# Patient Record
Sex: FEMALE | Race: ASIAN | NOT HISPANIC OR LATINO | URBAN - METROPOLITAN AREA
[De-identification: names, ages, dates, MRNs, and addresses within clinical notes are randomized per-mention and may not be internally consistent; named-entity substitution may affect disease eponyms.]

---

## 2017-01-06 ENCOUNTER — EMERGENCY (EMERGENCY)
Facility: HOSPITAL | Age: 37
LOS: 1 days | Discharge: HOME | End: 2017-01-06
Attending: EMERGENCY MEDICINE | Admitting: EMERGENCY MEDICINE
Payer: COMMERCIAL

## 2017-01-06 VITALS
HEART RATE: 97 BPM | SYSTOLIC BLOOD PRESSURE: 118 MMHG | TEMPERATURE: 98 F | RESPIRATION RATE: 18 BRPM | OXYGEN SATURATION: 100 % | DIASTOLIC BLOOD PRESSURE: 77 MMHG

## 2017-01-06 VITALS
SYSTOLIC BLOOD PRESSURE: 110 MMHG | DIASTOLIC BLOOD PRESSURE: 83 MMHG | OXYGEN SATURATION: 100 % | HEART RATE: 69 BPM | RESPIRATION RATE: 18 BRPM

## 2017-01-06 PROCEDURE — 99284 EMERGENCY DEPT VISIT MOD MDM: CPT | Mod: 25

## 2017-01-06 PROCEDURE — 93010 ELECTROCARDIOGRAM REPORT: CPT

## 2017-01-06 NOTE — ED PROVIDER NOTE - PLAN OF CARE
1) Please follow-up with your primary care doctor in the next 5-7 days.  Please call tomorrow for an appointment.  If you cannot follow-up with your primary care doctor please return to the ED for any urgent issues.  2) You were given a copy of the tests performed today.  Please bring the results with you and review them with your primary care doctor.  3) If you have any worsening of symptoms or any other concerns please return to the ED immediately.  4) Please continue taking your home medications as directed.  5) Please follow-up with Endocrinology as an outpatient for further management of your hypothyroidism.  You were given a referral list.

## 2017-01-06 NOTE — ED PROVIDER NOTE - MEDICAL DECISION MAKING DETAILS
37 yo female w/ pmh of hypothyroidism presents w/ palpitations after taking over the counter thyroid supplementation.  likely side effect of that medication.  instructed patient to stop taking that medication and follow-up with endocrinology as an outpatient.

## 2017-01-06 NOTE — ED ADULT TRIAGE NOTE - CHIEF COMPLAINT QUOTE
pt says she woke up with palpitation .pt says she took hypothyroidism medicine and went to sleep and woke up with palpitation.," my body felt hot". denies CP or SOB. c/o mild chest discomfort.also had some pain in the chest when she woke up which radiated down her left arm which lasted for 10 minutes.

## 2017-01-06 NOTE — ED PROVIDER NOTE - ATTENDING CONTRIBUTION TO CARE
Dr. Ramírez: I have personally performed a face to face bedside history and physical examination of this patient. I have discussed the history, examination, review of systems, assessment and plan of management with the resident. I have reviewed the electronic medical record and amended it to reflect my history, review of systems, physical exam, assessment and plan.  37 yo F dx'ed with hypothyroidism 2 mos ago, not started on any meds, took a thyroid supplement (has iodine in it)  yesterday from [employer]. After taking the supplement pt felt her heart racing and body burning, which has now resolved. No cp or sob. Takes probiotics and MVI, no other supplements.  On exam pt appears well, nad, rrr, ctab, abdo soft/nt/nd.  Plan - rx likely 2ndry to supplements, now resolved. Advised pt not to take non-rx supplements. Endocrine f/u given.

## 2017-01-06 NOTE — ED PROVIDER NOTE - CARE PLAN
Principal Discharge DX:	Palpitations  Instructions for follow-up, activity and diet:	1) Please follow-up with your primary care doctor in the next 5-7 days.  Please call tomorrow for an appointment.  If you cannot follow-up with your primary care doctor please return to the ED for any urgent issues.  2) You were given a copy of the tests performed today.  Please bring the results with you and review them with your primary care doctor.  3) If you have any worsening of symptoms or any other concerns please return to the ED immediately.  4) Please continue taking your home medications as directed.  5) Please follow-up with Endocrinology as an outpatient for further management of your hypothyroidism.  You were given a referral list.

## 2017-01-06 NOTE — ED PROVIDER NOTE - OBJECTIVE STATEMENT
35 yo female w/ pmh of hypothyroidism presents w/ palpiations.  Pt states that she was diagnosed w/ hypothyroidism as an outpatient, not started on any medications.  Pt ordered some thyroid supplements from [facility] and started taking them today.  "Thyroid Support" (with Iodine).  States that she woke up this morning w/ palpitations and fevers.    Denies fever, chills, SOB, weakness, N, V, D, abdominal pain, bladder and bowel problems, leg swelling, sick ocntact, recent travel.  LMP:  12/26/2016  Medications:  Vitamins,Probiotics 37 yo female w/ pmh of hypothyroidism presents w/ palpitations.  Pt states that she was diagnosed w/ hypothyroidism as an outpatient, not started on any medications.  Pt ordered some thyroid supplements from [facility] [place] and started taking them today.  "Thyroid Support" (with Iodine).  States that she woke up this morning w/ palpitations and fevers.    Denies fever, chills, SOB, weakness, N, V, D, abdominal pain, bladder and bowel problems, leg swelling, sick ocntact, recent travel.  LMP:  12/26/2016  Medications:  Vitamins,Probiotics 35 yo female w/ pmh of hypothyroidism presents w/ palpitations.  Pt states that she was diagnosed w/ hypothyroidism as an outpatient, not started on any medications.  Pt ordered some thyroid supplements from [facility] and started taking them today.  "Thyroid Support" (with Iodine).  States that she woke up this morning w/ palpitations and fevers.    Denies fever, chills, SOB, weakness, N, V, D, abdominal pain, bladder and bowel problems, leg swelling, sick contact, recent travel.  LMP:  12/26/2016  Medications:  Vitamins,Probiotics

## 2017-03-02 ENCOUNTER — APPOINTMENT (OUTPATIENT)
Dept: ENDOCRINOLOGY | Facility: CLINIC | Age: 37
End: 2017-03-02

## 2017-04-07 ENCOUNTER — APPOINTMENT (OUTPATIENT)
Dept: HUMAN REPRODUCTION | Facility: CLINIC | Age: 37
End: 2017-04-07

## 2017-04-18 ENCOUNTER — APPOINTMENT (OUTPATIENT)
Dept: HUMAN REPRODUCTION | Facility: CLINIC | Age: 37
End: 2017-04-18

## 2017-04-25 ENCOUNTER — APPOINTMENT (OUTPATIENT)
Dept: HUMAN REPRODUCTION | Facility: CLINIC | Age: 37
End: 2017-04-25

## 2017-05-11 ENCOUNTER — APPOINTMENT (OUTPATIENT)
Dept: HUMAN REPRODUCTION | Facility: CLINIC | Age: 37
End: 2017-05-11

## 2017-05-11 RX ORDER — GANIRELIX ACETATE 250 UG/.5ML
250 INJECTION, SOLUTION SUBCUTANEOUS
Qty: 7 | Refills: 1 | Status: ACTIVE | COMMUNITY
Start: 2017-05-10 | End: 1900-01-01

## 2017-05-11 RX ORDER — MENOTROPINS 75 UNIT
75 KIT SUBCUTANEOUS
Qty: 30 | Refills: 1 | Status: ACTIVE | COMMUNITY
Start: 2017-05-10 | End: 1900-01-01

## 2017-05-11 RX ORDER — SYRINGE WITH NEEDLE, 1 ML 25GX5/8"
22G X 1-1/2" SYRINGE, EMPTY DISPOSABLE MISCELLANEOUS
Qty: 20 | Refills: 1 | Status: ACTIVE | COMMUNITY
Start: 2017-05-10 | End: 1900-01-01

## 2017-05-11 RX ORDER — DESOGESTREL AND ETHINYL ESTRADIOL 0.15-0.03
0.15-3 KIT ORAL DAILY
Qty: 1 | Refills: 1 | Status: ACTIVE | COMMUNITY
Start: 2017-04-18 | End: 1900-01-01

## 2017-05-11 RX ORDER — ISOPROPYL ALCOHOL 70 ML/100ML
SWAB TOPICAL
Qty: 50 | Refills: 0 | Status: ACTIVE | COMMUNITY
Start: 2017-05-10 | End: 1900-01-01

## 2017-05-11 RX ORDER — NEEDLES, DISPOSABLE 25GX5/8"
27G X 1/2" NEEDLE, DISPOSABLE MISCELLANEOUS
Qty: 20 | Refills: 0 | Status: ACTIVE | COMMUNITY
Start: 2017-05-10 | End: 1900-01-01

## 2017-05-11 RX ORDER — FOLLITROPIN ALFA 1050 UNIT
1050 KIT SUBCUTANEOUS
Qty: 3 | Refills: 1 | Status: ACTIVE | COMMUNITY
Start: 2017-05-10 | End: 1900-01-01

## 2017-05-11 RX ORDER — DOXYCYCLINE HYCLATE 100 MG/1
100 CAPSULE ORAL TWICE DAILY
Qty: 10 | Refills: 0 | Status: ACTIVE | COMMUNITY
Start: 2017-05-10 | End: 1900-01-01

## 2017-05-11 RX ORDER — CONTAINER,EMPTY
EACH MISCELLANEOUS
Qty: 2 | Refills: 0 | Status: ACTIVE | COMMUNITY
Start: 2017-05-10 | End: 1900-01-01

## 2017-05-15 ENCOUNTER — OTHER (OUTPATIENT)
Age: 37
End: 2017-05-15

## 2017-05-15 ENCOUNTER — APPOINTMENT (OUTPATIENT)
Dept: HUMAN REPRODUCTION | Facility: CLINIC | Age: 37
End: 2017-05-15

## 2017-05-15 RX ORDER — CONTAINER,EMPTY
EACH MISCELLANEOUS
Qty: 2 | Refills: 0 | Status: ACTIVE | COMMUNITY
Start: 2017-05-15 | End: 1900-01-01

## 2017-05-15 RX ORDER — ISOPROPYL ALCOHOL 70 ML/100ML
SWAB TOPICAL
Qty: 50 | Refills: 0 | Status: ACTIVE | COMMUNITY
Start: 2017-05-15 | End: 1900-01-01

## 2017-05-15 RX ORDER — METHYLPREDNISOLONE 8 MG/1
8 TABLET ORAL
Qty: 10 | Refills: 0 | Status: ACTIVE | COMMUNITY
Start: 2017-05-15 | End: 1900-01-01

## 2017-05-15 RX ORDER — DOXYCYCLINE HYCLATE 100 MG/1
100 CAPSULE ORAL TWICE DAILY
Qty: 10 | Refills: 0 | Status: ACTIVE | COMMUNITY
Start: 2017-05-15 | End: 1900-01-01

## 2017-05-15 RX ORDER — PROGESTERONE 90 MG/1.125G
8 GEL VAGINAL
Qty: 30 | Refills: 2 | Status: ACTIVE | COMMUNITY
Start: 2017-05-15 | End: 1900-01-01

## 2017-05-15 RX ORDER — SYRINGE WITH NEEDLE, 1 ML 25GX5/8"
22G X 1-1/2" SYRINGE, EMPTY DISPOSABLE MISCELLANEOUS
Qty: 20 | Refills: 1 | Status: ACTIVE | COMMUNITY
Start: 2017-05-15 | End: 1900-01-01

## 2017-05-15 RX ORDER — MENOTROPINS 75 UNIT
75 KIT SUBCUTANEOUS
Qty: 30 | Refills: 1 | Status: ACTIVE | COMMUNITY
Start: 2017-05-15 | End: 1900-01-01

## 2017-05-15 RX ORDER — FOLLITROPIN 900 [IU]/1.08ML
900 INJECTION, SOLUTION SUBCUTANEOUS
Qty: 3 | Refills: 1 | Status: ACTIVE | COMMUNITY
Start: 2017-05-15 | End: 1900-01-01

## 2017-05-15 RX ORDER — NEEDLES, DISPOSABLE 25GX5/8"
27G X 1/2" NEEDLE, DISPOSABLE MISCELLANEOUS
Qty: 20 | Refills: 0 | Status: ACTIVE | COMMUNITY
Start: 2017-05-15 | End: 1900-01-01

## 2017-05-15 RX ORDER — CHORIONIC GONADOTROPIN 10000 UNIT
10000 KIT INTRAMUSCULAR
Qty: 1 | Refills: 0 | Status: ACTIVE | OUTPATIENT
Start: 2017-05-15

## 2017-05-22 ENCOUNTER — OTHER (OUTPATIENT)
Age: 37
End: 2017-05-22

## 2017-05-22 RX ORDER — CETRORELIX ACETATE 0.25 MG
0.25 KIT SUBCUTANEOUS
Qty: 8 | Refills: 1 | Status: ACTIVE | COMMUNITY
Start: 2017-05-22 | End: 1900-01-01

## 2017-06-05 ENCOUNTER — APPOINTMENT (OUTPATIENT)
Dept: HUMAN REPRODUCTION | Facility: CLINIC | Age: 37
End: 2017-06-05

## 2017-06-11 ENCOUNTER — APPOINTMENT (OUTPATIENT)
Dept: HUMAN REPRODUCTION | Facility: CLINIC | Age: 37
End: 2017-06-11

## 2017-06-12 ENCOUNTER — APPOINTMENT (OUTPATIENT)
Dept: HUMAN REPRODUCTION | Facility: CLINIC | Age: 37
End: 2017-06-12

## 2017-06-14 ENCOUNTER — APPOINTMENT (OUTPATIENT)
Dept: HUMAN REPRODUCTION | Facility: CLINIC | Age: 37
End: 2017-06-14

## 2017-06-15 ENCOUNTER — APPOINTMENT (OUTPATIENT)
Dept: HUMAN REPRODUCTION | Facility: CLINIC | Age: 37
End: 2017-06-15

## 2017-06-16 ENCOUNTER — APPOINTMENT (OUTPATIENT)
Dept: HUMAN REPRODUCTION | Facility: CLINIC | Age: 37
End: 2017-06-16

## 2017-06-17 ENCOUNTER — APPOINTMENT (OUTPATIENT)
Dept: HUMAN REPRODUCTION | Facility: CLINIC | Age: 37
End: 2017-06-17

## 2017-06-18 ENCOUNTER — APPOINTMENT (OUTPATIENT)
Dept: HUMAN REPRODUCTION | Facility: CLINIC | Age: 37
End: 2017-06-18

## 2017-06-19 ENCOUNTER — APPOINTMENT (OUTPATIENT)
Dept: HUMAN REPRODUCTION | Facility: CLINIC | Age: 37
End: 2017-06-19

## 2017-06-20 ENCOUNTER — APPOINTMENT (OUTPATIENT)
Dept: HUMAN REPRODUCTION | Facility: CLINIC | Age: 37
End: 2017-06-20

## 2017-06-23 ENCOUNTER — APPOINTMENT (OUTPATIENT)
Dept: HUMAN REPRODUCTION | Facility: CLINIC | Age: 37
End: 2017-06-23

## 2017-06-25 ENCOUNTER — APPOINTMENT (OUTPATIENT)
Dept: HUMAN REPRODUCTION | Facility: CLINIC | Age: 37
End: 2017-06-25

## 2017-07-05 ENCOUNTER — OUTPATIENT (OUTPATIENT)
Dept: OUTPATIENT SERVICES | Facility: HOSPITAL | Age: 37
LOS: 1 days | Discharge: STILL ADMITTED OR ADMINISTRATIVE | End: 2017-07-05

## 2017-07-06 DIAGNOSIS — F41.9 ANXIETY DISORDER, UNSPECIFIED: ICD-10-CM

## 2017-07-10 ENCOUNTER — APPOINTMENT (OUTPATIENT)
Dept: HUMAN REPRODUCTION | Facility: CLINIC | Age: 37
End: 2017-07-10

## 2017-09-08 ENCOUNTER — APPOINTMENT (OUTPATIENT)
Dept: HUMAN REPRODUCTION | Facility: CLINIC | Age: 37
End: 2017-09-08
Payer: COMMERCIAL

## 2017-09-08 DIAGNOSIS — N97.9 FEMALE INFERTILITY, UNSPECIFIED: ICD-10-CM

## 2017-09-08 PROCEDURE — 99213 OFFICE O/P EST LOW 20 MIN: CPT | Mod: 25

## 2017-09-08 PROCEDURE — 76830 TRANSVAGINAL US NON-OB: CPT

## 2017-09-08 PROCEDURE — 36415 COLL VENOUS BLD VENIPUNCTURE: CPT

## 2017-09-08 RX ORDER — PROGESTERONE 90 MG/1.125G
8 GEL VAGINAL
Qty: 30 | Refills: 4 | Status: ACTIVE | COMMUNITY
Start: 2017-09-08 | End: 1900-01-01

## 2017-09-08 RX ORDER — METHYLPREDNISOLONE 8 MG/1
8 TABLET ORAL
Qty: 8 | Refills: 1 | Status: ACTIVE | COMMUNITY
Start: 2017-09-08 | End: 1900-01-01

## 2017-09-08 RX ORDER — DOXYCYCLINE HYCLATE 100 MG/1
100 CAPSULE ORAL
Qty: 8 | Refills: 1 | Status: ACTIVE | COMMUNITY
Start: 2017-09-08 | End: 1900-01-01

## 2017-09-09 ENCOUNTER — EMERGENCY (EMERGENCY)
Facility: HOSPITAL | Age: 37
LOS: 1 days | Discharge: HOME | End: 2017-09-09
Attending: EMERGENCY MEDICINE | Admitting: EMERGENCY MEDICINE
Payer: COMMERCIAL

## 2017-09-09 VITALS
SYSTOLIC BLOOD PRESSURE: 134 MMHG | HEART RATE: 70 BPM | TEMPERATURE: 98 F | RESPIRATION RATE: 15 BRPM | OXYGEN SATURATION: 99 % | DIASTOLIC BLOOD PRESSURE: 91 MMHG

## 2017-09-09 PROCEDURE — 99284 EMERGENCY DEPT VISIT MOD MDM: CPT

## 2017-09-09 RX ORDER — IBUPROFEN 200 MG
600 TABLET ORAL ONCE
Qty: 0 | Refills: 0 | Status: COMPLETED | OUTPATIENT
Start: 2017-09-09 | End: 2017-09-09

## 2017-09-09 NOTE — ED ADULT TRIAGE NOTE - CHIEF COMPLAINT QUOTE
Pt c/o "pulsating sensation in head for past 2 hours."  Pt reporting dizziness, states "I'm going to faint."  Pt taken to ambulance bay to lay down. Pt c/o "pulsating sensation in head for past 2 hours."  Pt reporting dizziness, states "I'm going to faint."  Pt taken to ambulance bay to lay down.  Pt receives every Milrinone in D5W every 24 hrs through PICC line from visiting RN service.

## 2017-09-10 VITALS
HEART RATE: 70 BPM | TEMPERATURE: 98 F | SYSTOLIC BLOOD PRESSURE: 137 MMHG | DIASTOLIC BLOOD PRESSURE: 94 MMHG | OXYGEN SATURATION: 100 % | RESPIRATION RATE: 18 BRPM

## 2017-09-10 PROCEDURE — 70450 CT HEAD/BRAIN W/O DYE: CPT | Mod: 26

## 2017-09-10 RX ADMIN — Medication 600 MILLIGRAM(S): at 01:18

## 2017-09-10 RX ADMIN — Medication 600 MILLIGRAM(S): at 00:18

## 2017-09-10 NOTE — ED PROVIDER NOTE - OBJECTIVE STATEMENT
37 yo F with hx of chronic HAs, states rarely takes OTC meds, gets ~1 HA per month which is strong and lasts "all day" and often vomits and goes to sleep, then feels better. Now with gradual onset of HA similar to prev. which worsened today, is one-sided and pounding, took OTC meds with no relief at home, then vomited at home and does not feel better. +mild photophobia, no change in vision. No prev. W/U, denies hx of migraine or family hx of migraine, no neck stiffness.

## 2017-09-10 NOTE — ED PROVIDER NOTE - PROGRESS NOTE DETAILS
LEIGHTON Cazares: Pt signed out to me by Dr. Faust to follow up CT head. CT head negative. Pt to go home with follow up with Neurologist. LEIGHTON Cazares: Pt signed out to me by Dr. Faust to follow up CT head if CT head negative ( result negative) pt ok to go home with neuro followup. Pt to go home with follow up with Neurologist. LEIGHTON Cazares: Pt signed out to me by Dr. Faust to follow up CT head if CT head negative ( result negative) pt feeling better and amendable with plan to go home with neuro followup. Pt to go home with follow up with Neurologist.

## 2017-09-10 NOTE — ED PROVIDER NOTE - MEDICAL DECISION MAKING DETAILS
37yo F with HA, +hx of chronic HAs but now worse, similar to prev. today but more intractable, no prev. imaging. CT head, pain ctrl, reassess, poss. Neuro FU.

## 2017-09-10 NOTE — ED PROVIDER NOTE - CARE PLAN
Principal Discharge DX:	Headache Principal Discharge DX:	Headache  Instructions for follow-up, activity and diet:	Follow up with Neurologist (referral list provided) within the week. Follow up with your Primary Medical Doctor within 2-3days. If results or reports were given to you, show copies of your reports given to you. Take all of your medications as previously prescribed. Take Motrin 600mg every 8hrs with food for pain. Take Tylenol 650mg 1 tab every 4-6 hours as needed for pain. Principal Discharge DX:	Headache  Instructions for follow-up, activity and diet:	Follow up with Neurologist (referral list provided) within the week. Follow up with your Primary Medical Doctor within 2-3days. If results or reports were given to you, show copies of your reports given to you. Take all of your medications as previously prescribed. Take Motrin 600mg every 8hrs with food for pain. Take Tylenol 650mg 1 tab every 4-6 hours as needed for pain. If worsening headache, fevers, vomiting, visual changes or any other new or concerning symptoms return to the ED or seel medical attention.

## 2017-09-10 NOTE — ED ADULT NURSE NOTE - CHIEF COMPLAINT QUOTE
Pt c/o "pulsating sensation in head for past 2 hours."  Pt reporting dizziness, states "I'm going to faint."  Pt taken to ambulance bay to lay down.

## 2017-09-10 NOTE — ED ADULT NURSE NOTE - OBJECTIVE STATEMENT
pt. came in c/o pressure on her head x 2days and dizziness when she walks. denies any head trauma, no n/v/fever. denies any numbness/weakness/neuro def. pt. seen by MD. Motrin given as ordered. awaits CT.

## 2017-09-10 NOTE — ED PROVIDER NOTE - PLAN OF CARE
Follow up with Neurologist (referral list provided) within the week. Follow up with your Primary Medical Doctor within 2-3days. If results or reports were given to you, show copies of your reports given to you. Take all of your medications as previously prescribed. Take Motrin 600mg every 8hrs with food for pain. Take Tylenol 650mg 1 tab every 4-6 hours as needed for pain. Follow up with Neurologist (referral list provided) within the week. Follow up with your Primary Medical Doctor within 2-3days. If results or reports were given to you, show copies of your reports given to you. Take all of your medications as previously prescribed. Take Motrin 600mg every 8hrs with food for pain. Take Tylenol 650mg 1 tab every 4-6 hours as needed for pain. If worsening headache, fevers, vomiting, visual changes or any other new or concerning symptoms return to the ED or seel medical attention.

## 2017-09-11 ENCOUNTER — APPOINTMENT (OUTPATIENT)
Dept: HUMAN REPRODUCTION | Facility: CLINIC | Age: 37
End: 2017-09-11

## 2017-09-13 ENCOUNTER — EMERGENCY (EMERGENCY)
Facility: HOSPITAL | Age: 37
LOS: 1 days | Discharge: HOME | End: 2017-09-13
Attending: EMERGENCY MEDICINE | Admitting: EMERGENCY MEDICINE
Payer: COMMERCIAL

## 2017-09-13 VITALS
DIASTOLIC BLOOD PRESSURE: 63 MMHG | RESPIRATION RATE: 18 BRPM | OXYGEN SATURATION: 100 % | HEIGHT: 64 IN | WEIGHT: 190.04 LBS | TEMPERATURE: 98 F | SYSTOLIC BLOOD PRESSURE: 140 MMHG | HEART RATE: 90 BPM

## 2017-09-13 PROCEDURE — 99284 EMERGENCY DEPT VISIT MOD MDM: CPT | Mod: 25

## 2017-09-13 NOTE — ED ADULT TRIAGE NOTE - CHIEF COMPLAINT QUOTE
pt c/o top of head and forehead numbness x 2 days and reports she has been unable to sleep since which has been making her feel anxious. denies ha, dizziness, visual disturbances or any additional symptoms. PMHX: postpartum depression

## 2017-09-14 ENCOUNTER — APPOINTMENT (OUTPATIENT)
Dept: HUMAN REPRODUCTION | Facility: CLINIC | Age: 37
End: 2017-09-14

## 2017-09-14 VITALS
HEART RATE: 99 BPM | RESPIRATION RATE: 16 BRPM | OXYGEN SATURATION: 100 % | TEMPERATURE: 98 F | SYSTOLIC BLOOD PRESSURE: 133 MMHG | DIASTOLIC BLOOD PRESSURE: 88 MMHG

## 2017-09-14 LAB
ALBUMIN SERPL ELPH-MCNC: 4.5 G/DL — SIGNIFICANT CHANGE UP (ref 3.3–5)
ALP SERPL-CCNC: 65 U/L — SIGNIFICANT CHANGE UP (ref 40–120)
ALT FLD-CCNC: 19 U/L — SIGNIFICANT CHANGE UP (ref 4–33)
AST SERPL-CCNC: 18 U/L — SIGNIFICANT CHANGE UP (ref 4–32)
BASOPHILS # BLD AUTO: 0.07 K/UL — SIGNIFICANT CHANGE UP (ref 0–0.2)
BASOPHILS NFR BLD AUTO: 0.9 % — SIGNIFICANT CHANGE UP (ref 0–2)
BILIRUB SERPL-MCNC: 0.3 MG/DL — SIGNIFICANT CHANGE UP (ref 0.2–1.2)
BUN SERPL-MCNC: 12 MG/DL — SIGNIFICANT CHANGE UP (ref 7–23)
CALCIUM SERPL-MCNC: 9.5 MG/DL — SIGNIFICANT CHANGE UP (ref 8.4–10.5)
CHLORIDE SERPL-SCNC: 103 MMOL/L — SIGNIFICANT CHANGE UP (ref 98–107)
CO2 SERPL-SCNC: 24 MMOL/L — SIGNIFICANT CHANGE UP (ref 22–31)
CREAT SERPL-MCNC: 0.66 MG/DL — SIGNIFICANT CHANGE UP (ref 0.5–1.3)
EOSINOPHIL # BLD AUTO: 0.26 K/UL — SIGNIFICANT CHANGE UP (ref 0–0.5)
EOSINOPHIL NFR BLD AUTO: 3.5 % — SIGNIFICANT CHANGE UP (ref 0–6)
GLUCOSE SERPL-MCNC: 105 MG/DL — HIGH (ref 70–99)
HCT VFR BLD CALC: 42.5 % — SIGNIFICANT CHANGE UP (ref 34.5–45)
HGB BLD-MCNC: 14.8 G/DL — SIGNIFICANT CHANGE UP (ref 11.5–15.5)
IMM GRANULOCYTES # BLD AUTO: 0.02 # — SIGNIFICANT CHANGE UP
IMM GRANULOCYTES NFR BLD AUTO: 0.3 % — SIGNIFICANT CHANGE UP (ref 0–1.5)
LYMPHOCYTES # BLD AUTO: 3.76 K/UL — HIGH (ref 1–3.3)
LYMPHOCYTES # BLD AUTO: 49.9 % — HIGH (ref 13–44)
MCHC RBC-ENTMCNC: 29.4 PG — SIGNIFICANT CHANGE UP (ref 27–34)
MCHC RBC-ENTMCNC: 34.8 % — SIGNIFICANT CHANGE UP (ref 32–36)
MCV RBC AUTO: 84.5 FL — SIGNIFICANT CHANGE UP (ref 80–100)
MONOCYTES # BLD AUTO: 0.64 K/UL — SIGNIFICANT CHANGE UP (ref 0–0.9)
MONOCYTES NFR BLD AUTO: 8.5 % — SIGNIFICANT CHANGE UP (ref 2–14)
NEUTROPHILS # BLD AUTO: 2.78 K/UL — SIGNIFICANT CHANGE UP (ref 1.8–7.4)
NEUTROPHILS NFR BLD AUTO: 36.9 % — LOW (ref 43–77)
NRBC # FLD: 0 — SIGNIFICANT CHANGE UP
PLATELET # BLD AUTO: 238 K/UL — SIGNIFICANT CHANGE UP (ref 150–400)
PMV BLD: 9.8 FL — SIGNIFICANT CHANGE UP (ref 7–13)
POTASSIUM SERPL-MCNC: 3.9 MMOL/L — SIGNIFICANT CHANGE UP (ref 3.5–5.3)
POTASSIUM SERPL-SCNC: 3.9 MMOL/L — SIGNIFICANT CHANGE UP (ref 3.5–5.3)
PROT SERPL-MCNC: 7.5 G/DL — SIGNIFICANT CHANGE UP (ref 6–8.3)
RBC # BLD: 5.03 M/UL — SIGNIFICANT CHANGE UP (ref 3.8–5.2)
RBC # FLD: 12.5 % — SIGNIFICANT CHANGE UP (ref 10.3–14.5)
SODIUM SERPL-SCNC: 140 MMOL/L — SIGNIFICANT CHANGE UP (ref 135–145)
TSH SERPL-MCNC: 3.69 UIU/ML — SIGNIFICANT CHANGE UP (ref 0.27–4.2)
WBC # BLD: 7.53 K/UL — SIGNIFICANT CHANGE UP (ref 3.8–10.5)
WBC # FLD AUTO: 7.53 K/UL — SIGNIFICANT CHANGE UP (ref 3.8–10.5)

## 2017-09-14 RX ORDER — SODIUM CHLORIDE 9 MG/ML
1000 INJECTION INTRAMUSCULAR; INTRAVENOUS; SUBCUTANEOUS ONCE
Qty: 0 | Refills: 0 | Status: DISCONTINUED | OUTPATIENT
Start: 2017-09-14 | End: 2017-09-14

## 2017-09-14 RX ORDER — ACETAMINOPHEN 500 MG
650 TABLET ORAL ONCE
Qty: 0 | Refills: 0 | Status: COMPLETED | OUTPATIENT
Start: 2017-09-14 | End: 2017-09-14

## 2017-09-14 RX ORDER — ZOLPIDEM TARTRATE 10 MG/1
1 TABLET ORAL
Qty: 2 | Refills: 0 | OUTPATIENT
Start: 2017-09-14 | End: 2017-09-16

## 2017-09-14 RX ORDER — METOCLOPRAMIDE HCL 10 MG
10 TABLET ORAL ONCE
Qty: 0 | Refills: 0 | Status: DISCONTINUED | OUTPATIENT
Start: 2017-09-14 | End: 2017-09-14

## 2017-09-14 RX ADMIN — Medication 650 MILLIGRAM(S): at 04:28

## 2017-09-14 NOTE — ED PROVIDER NOTE - ATTENDING CONTRIBUTION TO CARE
36f, pmhx hypothyroid, per pt resolved after she took some herbal meds. her main complaint is that she has not been able to sleep for 3 days. c/o feeling tired and weak afterwards and feels dizzy when she walks. she presented to the ED 2 days ago for h/a and had a neg CT head. she still c/o head fullness but no weakness/numbnesn/parasthesias or change in vision. no ataxia. It appears her main complaint is the not sleeping. she does not know why, says not anxious and other symptoms are secondary. exam, vs wnl, nad. hs and lungs normal, abdo benign. CN II-XII normal. motor/sensation/tone/reflexes normal. gait normal, cerebellar signs normal. labs not checked 2 days ago so will check them with tsh today. no need for repeat head imaging given non-specific h/a and normal exam. if bloods normal, will d/c. has pomd for f/u.

## 2017-09-14 NOTE — ED PROVIDER NOTE - PLAN OF CARE
Follow up with your medical doctor in 2-3 days or call our clinic at [phone] and state you were seen in the Emergency Department and would like to be seen in clinic.   Please take ambien at night as needed to sleep.  Please do not drive or operate heavy machinery on this medication.  Please do not drink on this medication.    Drink at least 2 Liters or 64 Ounces of water each day.  Return for any persistent, worsening symptoms, or ANY concerns at all.

## 2017-09-14 NOTE — ED PROVIDER NOTE - CARE PLAN
Principal Discharge DX:	Tiredness Principal Discharge DX:	Tiredness  Instructions for follow-up, activity and diet:	Follow up with your medical doctor in 2-3 days or call our clinic at [phone] and state you were seen in the Emergency Department and would like to be seen in clinic.   Please take ambien at night as needed to sleep.  Please do not drive or operate heavy machinery on this medication.  Please do not drink on this medication.    Drink at least 2 Liters or 64 Ounces of water each day.  Return for any persistent, worsening symptoms, or ANY concerns at all.

## 2017-09-14 NOTE — ED PROVIDER NOTE - PROGRESS NOTE DETAILS
refusing iv except for butterfly for meds refusing iv except for butterfly for meds -rodolfo patient was sleeping comfortably when I went back to re assess the patient. - rodolfo patient was sleeping comfortably when I went back to re assess the patient. - rodolfo Xiao: labs normal. stable for d/c with pmd f/u.

## 2017-09-14 NOTE — ED PROVIDER NOTE - OBJECTIVE STATEMENT
37 yo female presenting with two day hx of inability to sleep, head fullness and tiredness.  patient was seen 2 days ago and was discharged after negative head ct. 37 yo female presenting with two day hx of inability to sleep, head fullness and tiredness.  patient was seen 2 days ago and was discharged after negative head ct.  states she feels more tired than usual.  worse with exertion, improved with rest has not taken anything for her symptoms.  head symptoms feels like heaviness/pressure, 5/10 in severity with no radiation located predominantly in the front of her head.  denies fevers chills n/v/d.

## 2017-09-14 NOTE — ED ADULT NURSE NOTE - OBJECTIVE STATEMENT
pt. a&ox3, came in c/o "I cannot sleep for the past 3-4 days. now, I feel numbness on my head." pt. also feels nauseaous, no vomiting, feels generalized weakness. pt. seen by MD. pt. refused IV line but allowed labs to be drawn by butterfly. MD Domingo made aware. meds given as ordered. awaits further eval. pt. a&ox3, came in c/o "I cannot sleep for the past 3-4 days. now, I feel numbness on my head." pt. also feels nauseaous, no vomiting, feels generalized weakness. denies any fever. pt. seen by MD. pt. refused IV line but allowed labs to be drawn by butterfly. MD Domingo made aware. meds given as ordered. awaits further eval.

## 2017-09-15 ENCOUNTER — APPOINTMENT (OUTPATIENT)
Dept: HUMAN REPRODUCTION | Facility: CLINIC | Age: 37
End: 2017-09-15
Payer: COMMERCIAL

## 2017-09-15 PROCEDURE — 99213 OFFICE O/P EST LOW 20 MIN: CPT | Mod: 25

## 2017-09-15 PROCEDURE — 36415 COLL VENOUS BLD VENIPUNCTURE: CPT

## 2017-09-15 PROCEDURE — 76830 TRANSVAGINAL US NON-OB: CPT

## 2017-09-18 ENCOUNTER — APPOINTMENT (OUTPATIENT)
Dept: HUMAN REPRODUCTION | Facility: CLINIC | Age: 37
End: 2017-09-18
Payer: COMMERCIAL

## 2017-09-18 PROCEDURE — 76830 TRANSVAGINAL US NON-OB: CPT

## 2017-09-18 PROCEDURE — 99213 OFFICE O/P EST LOW 20 MIN: CPT | Mod: 25

## 2017-09-18 PROCEDURE — 36415 COLL VENOUS BLD VENIPUNCTURE: CPT

## 2017-09-21 ENCOUNTER — APPOINTMENT (OUTPATIENT)
Dept: HUMAN REPRODUCTION | Facility: CLINIC | Age: 37
End: 2017-09-21
Payer: COMMERCIAL

## 2017-09-21 PROCEDURE — 76830 TRANSVAGINAL US NON-OB: CPT

## 2017-09-21 PROCEDURE — 99213 OFFICE O/P EST LOW 20 MIN: CPT | Mod: 25

## 2017-09-21 PROCEDURE — 36415 COLL VENOUS BLD VENIPUNCTURE: CPT

## 2017-09-23 ENCOUNTER — APPOINTMENT (OUTPATIENT)
Dept: HUMAN REPRODUCTION | Facility: CLINIC | Age: 37
End: 2017-09-23
Payer: COMMERCIAL

## 2017-09-23 PROCEDURE — 99213 OFFICE O/P EST LOW 20 MIN: CPT | Mod: 25

## 2017-09-23 PROCEDURE — 76830 TRANSVAGINAL US NON-OB: CPT

## 2017-09-23 PROCEDURE — 36415 COLL VENOUS BLD VENIPUNCTURE: CPT

## 2017-09-24 ENCOUNTER — APPOINTMENT (OUTPATIENT)
Dept: HUMAN REPRODUCTION | Facility: CLINIC | Age: 37
End: 2017-09-24
Payer: COMMERCIAL

## 2017-09-24 PROCEDURE — 76830 TRANSVAGINAL US NON-OB: CPT

## 2017-09-24 PROCEDURE — 36415 COLL VENOUS BLD VENIPUNCTURE: CPT

## 2017-09-24 PROCEDURE — 99213 OFFICE O/P EST LOW 20 MIN: CPT | Mod: 25

## 2017-09-25 ENCOUNTER — APPOINTMENT (OUTPATIENT)
Dept: HUMAN REPRODUCTION | Facility: CLINIC | Age: 37
End: 2017-09-25
Payer: COMMERCIAL

## 2017-09-25 PROCEDURE — 76830 TRANSVAGINAL US NON-OB: CPT

## 2017-09-25 PROCEDURE — 99213 OFFICE O/P EST LOW 20 MIN: CPT | Mod: 25

## 2017-09-25 PROCEDURE — 36415 COLL VENOUS BLD VENIPUNCTURE: CPT

## 2017-09-26 ENCOUNTER — APPOINTMENT (OUTPATIENT)
Dept: HUMAN REPRODUCTION | Facility: CLINIC | Age: 37
End: 2017-09-26
Payer: COMMERCIAL

## 2017-09-26 PROCEDURE — 99213 OFFICE O/P EST LOW 20 MIN: CPT | Mod: 25

## 2017-09-26 PROCEDURE — 36415 COLL VENOUS BLD VENIPUNCTURE: CPT

## 2017-09-26 PROCEDURE — 76830 TRANSVAGINAL US NON-OB: CPT

## 2017-09-27 ENCOUNTER — APPOINTMENT (OUTPATIENT)
Dept: HUMAN REPRODUCTION | Facility: CLINIC | Age: 37
End: 2017-09-27
Payer: COMMERCIAL

## 2017-09-27 PROCEDURE — 36415 COLL VENOUS BLD VENIPUNCTURE: CPT

## 2017-09-27 PROCEDURE — 76830 TRANSVAGINAL US NON-OB: CPT

## 2017-09-27 PROCEDURE — 99213 OFFICE O/P EST LOW 20 MIN: CPT | Mod: 25

## 2017-09-30 ENCOUNTER — APPOINTMENT (OUTPATIENT)
Dept: HUMAN REPRODUCTION | Facility: CLINIC | Age: 37
End: 2017-09-30
Payer: COMMERCIAL

## 2017-09-30 PROCEDURE — 89255 PREPARE EMBRYO FOR TRANSFER: CPT

## 2017-09-30 PROCEDURE — 89250 CULTR OOCYTE/EMBRYO <4 DAYS: CPT

## 2017-09-30 PROCEDURE — 89352 THAWING CRYOPRESRVED EMBRYO: CPT

## 2017-09-30 PROCEDURE — 76942 ECHO GUIDE FOR BIOPSY: CPT

## 2017-09-30 PROCEDURE — 89253 EMBRYO HATCHING: CPT

## 2017-09-30 PROCEDURE — 58999 UNLISTED PX FML GENITAL SYS: CPT

## 2017-09-30 PROCEDURE — 58974 EMBRYO TRANSFER INTRAUTERINE: CPT

## 2017-10-13 ENCOUNTER — APPOINTMENT (OUTPATIENT)
Dept: HUMAN REPRODUCTION | Facility: CLINIC | Age: 37
End: 2017-10-13
Payer: COMMERCIAL

## 2017-10-13 PROCEDURE — 36415 COLL VENOUS BLD VENIPUNCTURE: CPT

## 2018-01-29 ENCOUNTER — APPOINTMENT (OUTPATIENT)
Dept: HUMAN REPRODUCTION | Facility: CLINIC | Age: 38
End: 2018-01-29

## 2018-02-01 ENCOUNTER — APPOINTMENT (OUTPATIENT)
Dept: HUMAN REPRODUCTION | Facility: CLINIC | Age: 38
End: 2018-02-01

## 2018-03-17 ENCOUNTER — APPOINTMENT (OUTPATIENT)
Dept: HUMAN REPRODUCTION | Facility: CLINIC | Age: 38
End: 2018-03-17
Payer: COMMERCIAL

## 2018-03-17 PROCEDURE — 36415 COLL VENOUS BLD VENIPUNCTURE: CPT

## 2018-03-19 ENCOUNTER — APPOINTMENT (OUTPATIENT)
Dept: HUMAN REPRODUCTION | Facility: CLINIC | Age: 38
End: 2018-03-19
Payer: COMMERCIAL

## 2018-03-19 PROCEDURE — 76830 TRANSVAGINAL US NON-OB: CPT

## 2018-03-19 PROCEDURE — 58999 UNLISTED PX FML GENITAL SYS: CPT

## 2018-03-19 PROCEDURE — 99214 OFFICE O/P EST MOD 30 MIN: CPT | Mod: 25

## 2018-03-31 ENCOUNTER — APPOINTMENT (OUTPATIENT)
Dept: HUMAN REPRODUCTION | Facility: CLINIC | Age: 38
End: 2018-03-31
Payer: COMMERCIAL

## 2018-03-31 PROCEDURE — 99211 OFF/OP EST MAY X REQ PHY/QHP: CPT | Mod: 25

## 2018-03-31 PROCEDURE — 36415 COLL VENOUS BLD VENIPUNCTURE: CPT

## 2018-04-06 ENCOUNTER — APPOINTMENT (OUTPATIENT)
Dept: HUMAN REPRODUCTION | Facility: CLINIC | Age: 38
End: 2018-04-06
Payer: COMMERCIAL

## 2018-04-06 PROCEDURE — 58100 BIOPSY OF UTERUS LINING: CPT

## 2018-04-06 PROCEDURE — 76830 TRANSVAGINAL US NON-OB: CPT

## 2018-04-06 PROCEDURE — 99213 OFFICE O/P EST LOW 20 MIN: CPT | Mod: 25

## 2018-04-11 ENCOUNTER — APPOINTMENT (OUTPATIENT)
Dept: HUMAN REPRODUCTION | Facility: CLINIC | Age: 38
End: 2018-04-11
Payer: COMMERCIAL

## 2018-04-11 PROCEDURE — 76830 TRANSVAGINAL US NON-OB: CPT

## 2018-04-11 PROCEDURE — 99213 OFFICE O/P EST LOW 20 MIN: CPT | Mod: 25

## 2018-04-11 PROCEDURE — 36415 COLL VENOUS BLD VENIPUNCTURE: CPT

## 2018-04-16 ENCOUNTER — APPOINTMENT (OUTPATIENT)
Dept: HUMAN REPRODUCTION | Facility: CLINIC | Age: 38
End: 2018-04-16
Payer: COMMERCIAL

## 2018-04-16 PROCEDURE — 76830 TRANSVAGINAL US NON-OB: CPT

## 2018-04-16 PROCEDURE — 99213 OFFICE O/P EST LOW 20 MIN: CPT | Mod: 25

## 2018-04-16 PROCEDURE — 36415 COLL VENOUS BLD VENIPUNCTURE: CPT

## 2018-04-18 ENCOUNTER — APPOINTMENT (OUTPATIENT)
Dept: HUMAN REPRODUCTION | Facility: CLINIC | Age: 38
End: 2018-04-18
Payer: COMMERCIAL

## 2018-04-18 PROCEDURE — 99213 OFFICE O/P EST LOW 20 MIN: CPT | Mod: 25

## 2018-04-18 PROCEDURE — 36415 COLL VENOUS BLD VENIPUNCTURE: CPT

## 2018-04-18 PROCEDURE — 76830 TRANSVAGINAL US NON-OB: CPT

## 2018-04-19 ENCOUNTER — APPOINTMENT (OUTPATIENT)
Dept: HUMAN REPRODUCTION | Facility: CLINIC | Age: 38
End: 2018-04-19

## 2018-04-20 ENCOUNTER — APPOINTMENT (OUTPATIENT)
Dept: HUMAN REPRODUCTION | Facility: CLINIC | Age: 38
End: 2018-04-20
Payer: COMMERCIAL

## 2018-04-20 PROCEDURE — 76830 TRANSVAGINAL US NON-OB: CPT

## 2018-04-20 PROCEDURE — 99213 OFFICE O/P EST LOW 20 MIN: CPT | Mod: 25

## 2018-04-20 PROCEDURE — 36415 COLL VENOUS BLD VENIPUNCTURE: CPT

## 2018-04-21 ENCOUNTER — APPOINTMENT (OUTPATIENT)
Dept: HUMAN REPRODUCTION | Facility: CLINIC | Age: 38
End: 2018-04-21
Payer: COMMERCIAL

## 2018-04-21 ENCOUNTER — OTHER (OUTPATIENT)
Age: 38
End: 2018-04-21

## 2018-04-21 PROCEDURE — 99213 OFFICE O/P EST LOW 20 MIN: CPT | Mod: 25

## 2018-04-21 PROCEDURE — 76830 TRANSVAGINAL US NON-OB: CPT

## 2018-04-21 PROCEDURE — 36415 COLL VENOUS BLD VENIPUNCTURE: CPT

## 2018-04-22 RX ORDER — METHYLPREDNISOLONE 8 MG/1
8 TABLET ORAL
Qty: 8 | Refills: 0 | Status: ACTIVE | COMMUNITY
Start: 2018-04-22 | End: 1900-01-01

## 2018-04-22 RX ORDER — DOXYCYCLINE HYCLATE 100 MG/1
100 CAPSULE ORAL TWICE DAILY
Qty: 8 | Refills: 0 | Status: ACTIVE | COMMUNITY
Start: 2018-04-22 | End: 1900-01-01

## 2018-04-22 RX ORDER — DOXYCYCLINE HYCLATE 100 MG/1
100 CAPSULE ORAL TWICE DAILY
Qty: 8 | Refills: 0 | Status: ACTIVE | COMMUNITY
Start: 2018-04-21 | End: 1900-01-01

## 2018-04-22 RX ORDER — PROGESTERONE 90 MG/1.125G
8 GEL VAGINAL DAILY
Qty: 30 | Refills: 2 | Status: ACTIVE | COMMUNITY
Start: 2018-04-21 | End: 1900-01-01

## 2018-04-22 RX ORDER — METHYLPREDNISOLONE 8 MG/1
8 TABLET ORAL
Qty: 8 | Refills: 0 | Status: ACTIVE | COMMUNITY
Start: 2018-04-21 | End: 1900-01-01

## 2018-04-22 RX ORDER — PROGESTERONE 90 MG/1.125G
8 GEL VAGINAL DAILY
Qty: 15 | Refills: 4 | Status: ACTIVE | COMMUNITY
Start: 2018-04-22 | End: 1900-01-01

## 2018-04-25 ENCOUNTER — APPOINTMENT (OUTPATIENT)
Dept: HUMAN REPRODUCTION | Facility: CLINIC | Age: 38
End: 2018-04-25
Payer: COMMERCIAL

## 2018-04-25 PROCEDURE — 76942 ECHO GUIDE FOR BIOPSY: CPT

## 2018-04-25 PROCEDURE — 89250 CULTR OOCYTE/EMBRYO <4 DAYS: CPT

## 2018-04-25 PROCEDURE — 89253 EMBRYO HATCHING: CPT

## 2018-04-25 PROCEDURE — 58974 EMBRYO TRANSFER INTRAUTERINE: CPT

## 2018-04-25 PROCEDURE — 89352 THAWING CRYOPRESRVED EMBRYO: CPT

## 2018-04-25 PROCEDURE — 89398 UNLISTED REPROD MED LAB PROC: CPT

## 2018-04-25 PROCEDURE — 89255 PREPARE EMBRYO FOR TRANSFER: CPT

## 2018-08-15 ENCOUNTER — EMERGENCY (EMERGENCY)
Facility: HOSPITAL | Age: 38
LOS: 1 days | Discharge: HOME | End: 2018-08-15
Attending: EMERGENCY MEDICINE | Admitting: EMERGENCY MEDICINE
Payer: MEDICAID

## 2018-08-15 VITALS
TEMPERATURE: 99 F | OXYGEN SATURATION: 100 % | SYSTOLIC BLOOD PRESSURE: 119 MMHG | HEART RATE: 108 BPM | RESPIRATION RATE: 18 BRPM | DIASTOLIC BLOOD PRESSURE: 76 MMHG

## 2018-08-15 VITALS
DIASTOLIC BLOOD PRESSURE: 78 MMHG | SYSTOLIC BLOOD PRESSURE: 120 MMHG | HEART RATE: 105 BPM | TEMPERATURE: 98 F | RESPIRATION RATE: 16 BRPM | OXYGEN SATURATION: 100 %

## 2018-08-15 LAB
ALBUMIN SERPL ELPH-MCNC: 4.1 G/DL — SIGNIFICANT CHANGE UP (ref 3.3–5)
ALP SERPL-CCNC: 59 U/L — SIGNIFICANT CHANGE UP (ref 40–120)
ALT FLD-CCNC: 12 U/L — SIGNIFICANT CHANGE UP (ref 4–33)
APPEARANCE UR: SIGNIFICANT CHANGE UP
AST SERPL-CCNC: 13 U/L — SIGNIFICANT CHANGE UP (ref 4–32)
BACTERIA # UR AUTO: SIGNIFICANT CHANGE UP
BASE EXCESS BLDV CALC-SCNC: -1.5 MMOL/L — SIGNIFICANT CHANGE UP
BASOPHILS # BLD AUTO: 0.05 K/UL — SIGNIFICANT CHANGE UP (ref 0–0.2)
BASOPHILS NFR BLD AUTO: 0.5 % — SIGNIFICANT CHANGE UP (ref 0–2)
BILIRUB SERPL-MCNC: 0.2 MG/DL — SIGNIFICANT CHANGE UP (ref 0.2–1.2)
BILIRUB UR-MCNC: NEGATIVE — SIGNIFICANT CHANGE UP
BLOOD GAS VENOUS - CREATININE: < 0.36 MG/DL — LOW (ref 0.5–1.3)
BLOOD UR QL VISUAL: NEGATIVE — SIGNIFICANT CHANGE UP
BUN SERPL-MCNC: 4 MG/DL — LOW (ref 7–23)
CALCIUM SERPL-MCNC: 9.8 MG/DL — SIGNIFICANT CHANGE UP (ref 8.4–10.5)
CHLORIDE BLDV-SCNC: 109 MMOL/L — HIGH (ref 96–108)
CHLORIDE SERPL-SCNC: 102 MMOL/L — SIGNIFICANT CHANGE UP (ref 98–107)
CO2 SERPL-SCNC: 21 MMOL/L — LOW (ref 22–31)
COLOR SPEC: SIGNIFICANT CHANGE UP
CREAT SERPL-MCNC: 0.44 MG/DL — LOW (ref 0.5–1.3)
EOSINOPHIL # BLD AUTO: 0.08 K/UL — SIGNIFICANT CHANGE UP (ref 0–0.5)
EOSINOPHIL NFR BLD AUTO: 0.7 % — SIGNIFICANT CHANGE UP (ref 0–6)
GAS PNL BLDV: 134 MMOL/L — LOW (ref 136–146)
GLUCOSE BLDV-MCNC: 99 — SIGNIFICANT CHANGE UP (ref 70–99)
GLUCOSE SERPL-MCNC: 101 MG/DL — HIGH (ref 70–99)
GLUCOSE UR-MCNC: NEGATIVE — SIGNIFICANT CHANGE UP
HCO3 BLDV-SCNC: 23 MMOL/L — SIGNIFICANT CHANGE UP (ref 20–27)
HCT VFR BLD CALC: 39.1 % — SIGNIFICANT CHANGE UP (ref 34.5–45)
HCT VFR BLDV CALC: 43.3 % — SIGNIFICANT CHANGE UP (ref 34.5–45)
HGB BLD-MCNC: 13.7 G/DL — SIGNIFICANT CHANGE UP (ref 11.5–15.5)
HGB BLDV-MCNC: 14.1 G/DL — SIGNIFICANT CHANGE UP (ref 11.5–15.5)
IMM GRANULOCYTES # BLD AUTO: 0.09 # — SIGNIFICANT CHANGE UP
IMM GRANULOCYTES NFR BLD AUTO: 0.8 % — SIGNIFICANT CHANGE UP (ref 0–1.5)
KETONES UR-MCNC: NEGATIVE — SIGNIFICANT CHANGE UP
LACTATE BLDV-MCNC: 2.1 MMOL/L — HIGH (ref 0.5–2)
LEUKOCYTE ESTERASE UR-ACNC: NEGATIVE — SIGNIFICANT CHANGE UP
LYMPHOCYTES # BLD AUTO: 2.83 K/UL — SIGNIFICANT CHANGE UP (ref 1–3.3)
LYMPHOCYTES # BLD AUTO: 26 % — SIGNIFICANT CHANGE UP (ref 13–44)
MCHC RBC-ENTMCNC: 29.8 PG — SIGNIFICANT CHANGE UP (ref 27–34)
MCHC RBC-ENTMCNC: 35 % — SIGNIFICANT CHANGE UP (ref 32–36)
MCV RBC AUTO: 85.2 FL — SIGNIFICANT CHANGE UP (ref 80–100)
MONOCYTES # BLD AUTO: 0.85 K/UL — SIGNIFICANT CHANGE UP (ref 0–0.9)
MONOCYTES NFR BLD AUTO: 7.8 % — SIGNIFICANT CHANGE UP (ref 2–14)
MUCOUS THREADS # UR AUTO: SIGNIFICANT CHANGE UP
NEUTROPHILS # BLD AUTO: 7 K/UL — SIGNIFICANT CHANGE UP (ref 1.8–7.4)
NEUTROPHILS NFR BLD AUTO: 64.2 % — SIGNIFICANT CHANGE UP (ref 43–77)
NITRITE UR-MCNC: NEGATIVE — SIGNIFICANT CHANGE UP
NON-SQ EPI CELLS # UR AUTO: 1 — SIGNIFICANT CHANGE UP
NRBC # FLD: 0 — SIGNIFICANT CHANGE UP
PCO2 BLDV: 39 MMHG — LOW (ref 41–51)
PH BLDV: 7.39 PH — SIGNIFICANT CHANGE UP (ref 7.32–7.43)
PH UR: 7 — SIGNIFICANT CHANGE UP (ref 4.6–8)
PLATELET # BLD AUTO: 191 K/UL — SIGNIFICANT CHANGE UP (ref 150–400)
PMV BLD: 10.9 FL — SIGNIFICANT CHANGE UP (ref 7–13)
PO2 BLDV: 53 MMHG — HIGH (ref 35–40)
POTASSIUM BLDV-SCNC: 3.8 MMOL/L — SIGNIFICANT CHANGE UP (ref 3.4–4.5)
POTASSIUM SERPL-MCNC: 4 MMOL/L — SIGNIFICANT CHANGE UP (ref 3.5–5.3)
POTASSIUM SERPL-SCNC: 4 MMOL/L — SIGNIFICANT CHANGE UP (ref 3.5–5.3)
PROT SERPL-MCNC: 6.9 G/DL — SIGNIFICANT CHANGE UP (ref 6–8.3)
PROT UR-MCNC: NEGATIVE MG/DL — SIGNIFICANT CHANGE UP
RBC # BLD: 4.59 M/UL — SIGNIFICANT CHANGE UP (ref 3.8–5.2)
RBC # FLD: 12.6 % — SIGNIFICANT CHANGE UP (ref 10.3–14.5)
RBC CASTS # UR COMP ASSIST: SIGNIFICANT CHANGE UP (ref 0–?)
SAO2 % BLDV: 85.8 % — HIGH (ref 60–85)
SODIUM SERPL-SCNC: 138 MMOL/L — SIGNIFICANT CHANGE UP (ref 135–145)
SP GR SPEC: 1.01 — SIGNIFICANT CHANGE UP (ref 1–1.04)
SQUAMOUS # UR AUTO: SIGNIFICANT CHANGE UP
UROBILINOGEN FLD QL: NORMAL MG/DL — SIGNIFICANT CHANGE UP
WBC # BLD: 10.9 K/UL — HIGH (ref 3.8–10.5)
WBC # FLD AUTO: 10.9 K/UL — HIGH (ref 3.8–10.5)
WBC UR QL: SIGNIFICANT CHANGE UP (ref 0–?)

## 2018-08-15 PROCEDURE — 99284 EMERGENCY DEPT VISIT MOD MDM: CPT | Mod: 25

## 2018-08-15 NOTE — ED PROVIDER NOTE - MEDICAL DECISION MAKING DETAILS
36 y/o F with hypoglycemia at home in setting of poor po intake.  Blood glucose stable here, will eval for underlying infectious / metabolic etiology, reassess.

## 2018-08-15 NOTE — ED ADULT NURSE NOTE - NSIMPLEMENTINTERV_GEN_ALL_ED
Implemented All Universal Safety Interventions:  [place] to call system. Call bell, personal items and telephone within reach. Instruct patient to call for assistance. Room bathroom lighting operational. Non-slip footwear when patient is off stretcher. Physically safe environment: no spills, clutter or unnecessary equipment. Stretcher in lowest position, wheels locked, appropriate side rails in place.

## 2018-08-15 NOTE — ED ADULT NURSE NOTE - OBJECTIVE STATEMENT
pt received to TR A pt comes to ED for hypoglycemia. pt is 17 weeks pregnant pt S at home was 24 pt FS in triage was 171 pt repeat FS was 105. pt VSS pt states everything has been with pregnancy so far. 18 g placed in R AC labs were drawn and sent EDMD at bedside for eval awaiting further orders .

## 2018-08-15 NOTE — ED PROVIDER NOTE - PROGRESS NOTE DETAILS
LEIGHTON Saenz: Received signout from LEIGHTON Wilks: 37yF  @17 weeks p/w hypoglycemia at home due to decreased appetite, pending urine results. Owen BROWN: Pt remained asymptomatic here, blood glucose stable around 100 after multiple repeat fingersticks.  Labs wnl, no e/o uti.  Pt is stable for outpatient follow-up, OBGYN follow-up.

## 2018-08-15 NOTE — ED PROVIDER NOTE - OBJECTIVE STATEMENT
38 y/o F  at 17 weeks gestation with h/o gestational DM in previous pregnancy here with hypoglycemia.  Pt reports for the past 2 days she has episodes where she gets shaky and diaphoretic.  Pt's mother-in-law is a diabetic and gets these sxs when she is hypoglycemic and  suggested they check her blood sugar.  Pt reports fingerstick is 40-60s when she checks and she will take glucose tablets.  Pt is otherwise not taking any medications.  She reports poor appetite, states she is not hungry and does not typically eat between 3pm and 9am.  No fever, chills, cough, cp, abd pain, n/v/d, dysuria, rash.  No recent hospitalizations, sick contacts or travel.

## 2018-08-15 NOTE — ED ADULT NURSE NOTE - CHIEF COMPLAINT QUOTE
Pt C/O  hypoglycemia x 2 days. Pt states she is 17 weeks pregnant ; PMH gestational DM, does not take insulin. States FS at home was 24 tonight on personal glucometer, diaphoretic and confused Pt self administered glucose tablets at home: FS currently 171. Pt currently A&Ox4, appears comfortable, ambulatory in triage.

## 2018-08-15 NOTE — ED ADULT TRIAGE NOTE - CHIEF COMPLAINT QUOTE
Pt C/O  hypoglycemia x 2 days. Pt states she is 17 weeks pregnant ; PMH gestational DM. States FS at home was 24, diaphoretic and confused Pt self administered glucose tablets at home: FS currently 171. Pt C/O  hypoglycemia x 2 days. Pt states she is 17 weeks pregnant ; PMH gestational DM. States FS at home was 24, diaphoretic and confused Pt self administered glucose tablets at home: FS currently 171. Pt currently A&Ox4, appears comfortable, ambulatory in triage. Pt C/O  hypoglycemia x 2 days. Pt states she is 17 weeks pregnant ; PMH gestational DM, does not take insulin. States FS at home was 24 tonight on personal glucometer, diaphoretic and confused Pt self administered glucose tablets at home: FS currently 171. Pt currently A&Ox4, appears comfortable, ambulatory in triage.

## 2018-08-16 LAB
BACTERIA UR CULT: SIGNIFICANT CHANGE UP
SPECIMEN SOURCE: SIGNIFICANT CHANGE UP

## 2018-08-19 ENCOUNTER — EMERGENCY (EMERGENCY)
Facility: HOSPITAL | Age: 38
LOS: 1 days | Discharge: HOME | End: 2018-08-19
Attending: EMERGENCY MEDICINE
Payer: MEDICAID

## 2018-08-19 VITALS
SYSTOLIC BLOOD PRESSURE: 106 MMHG | TEMPERATURE: 99 F | OXYGEN SATURATION: 99 % | DIASTOLIC BLOOD PRESSURE: 69 MMHG | RESPIRATION RATE: 18 BRPM | HEIGHT: 64 IN | WEIGHT: 195.99 LBS | HEART RATE: 91 BPM

## 2018-08-19 VITALS
SYSTOLIC BLOOD PRESSURE: 111 MMHG | RESPIRATION RATE: 16 BRPM | HEART RATE: 94 BPM | OXYGEN SATURATION: 98 % | DIASTOLIC BLOOD PRESSURE: 66 MMHG

## 2018-08-19 LAB
ALBUMIN SERPL ELPH-MCNC: 4.2 G/DL — SIGNIFICANT CHANGE UP (ref 3.3–5)
ALP SERPL-CCNC: 64 U/L — SIGNIFICANT CHANGE UP (ref 40–120)
ALT FLD-CCNC: 12 U/L — SIGNIFICANT CHANGE UP (ref 10–45)
ANION GAP SERPL CALC-SCNC: 13 MMOL/L — SIGNIFICANT CHANGE UP (ref 5–17)
APTT BLD: 29.6 SEC — SIGNIFICANT CHANGE UP (ref 27.5–37.4)
AST SERPL-CCNC: 18 U/L — SIGNIFICANT CHANGE UP (ref 10–40)
BASE EXCESS BLDV CALC-SCNC: -0.2 MMOL/L — SIGNIFICANT CHANGE UP (ref -2–2)
BASOPHILS # BLD AUTO: 0.1 K/UL — SIGNIFICANT CHANGE UP (ref 0–0.2)
BASOPHILS NFR BLD AUTO: 0.4 % — SIGNIFICANT CHANGE UP (ref 0–2)
BILIRUB SERPL-MCNC: 0.2 MG/DL — SIGNIFICANT CHANGE UP (ref 0.2–1.2)
BUN SERPL-MCNC: 6 MG/DL — LOW (ref 7–23)
CA-I SERPL-SCNC: 1.25 MMOL/L — SIGNIFICANT CHANGE UP (ref 1.12–1.3)
CALCIUM SERPL-MCNC: 9.6 MG/DL — SIGNIFICANT CHANGE UP (ref 8.4–10.5)
CHLORIDE BLDV-SCNC: 105 MMOL/L — SIGNIFICANT CHANGE UP (ref 96–108)
CHLORIDE SERPL-SCNC: 102 MMOL/L — SIGNIFICANT CHANGE UP (ref 96–108)
CO2 BLDV-SCNC: 26 MMOL/L — SIGNIFICANT CHANGE UP (ref 22–30)
CO2 SERPL-SCNC: 22 MMOL/L — SIGNIFICANT CHANGE UP (ref 22–31)
CREAT SERPL-MCNC: 0.45 MG/DL — LOW (ref 0.5–1.3)
EOSINOPHIL # BLD AUTO: 0.1 K/UL — SIGNIFICANT CHANGE UP (ref 0–0.5)
EOSINOPHIL NFR BLD AUTO: 1.2 % — SIGNIFICANT CHANGE UP (ref 0–6)
GAS PNL BLDV: 139 MMOL/L — SIGNIFICANT CHANGE UP (ref 136–145)
GAS PNL BLDV: SIGNIFICANT CHANGE UP
GAS PNL BLDV: SIGNIFICANT CHANGE UP
GLUCOSE BLDV-MCNC: 85 MG/DL — SIGNIFICANT CHANGE UP (ref 70–99)
GLUCOSE SERPL-MCNC: 85 MG/DL — SIGNIFICANT CHANGE UP (ref 70–99)
HBA1C BLD-MCNC: 5.2 % — SIGNIFICANT CHANGE UP (ref 4–5.6)
HCO3 BLDV-SCNC: 25 MMOL/L — SIGNIFICANT CHANGE UP (ref 21–29)
HCT VFR BLD CALC: 40.6 % — SIGNIFICANT CHANGE UP (ref 34.5–45)
HCT VFR BLDA CALC: 44 % — SIGNIFICANT CHANGE UP (ref 39–50)
HGB BLD CALC-MCNC: 14.4 G/DL — SIGNIFICANT CHANGE UP (ref 11.5–15.5)
HGB BLD-MCNC: 14.1 G/DL — SIGNIFICANT CHANGE UP (ref 11.5–15.5)
INR BLD: 1.01 RATIO — SIGNIFICANT CHANGE UP (ref 0.88–1.16)
LACTATE BLDV-MCNC: 1.2 MMOL/L — SIGNIFICANT CHANGE UP (ref 0.7–2)
LYMPHOCYTES # BLD AUTO: 29.1 % — SIGNIFICANT CHANGE UP (ref 13–44)
LYMPHOCYTES # BLD AUTO: 3.4 K/UL — HIGH (ref 1–3.3)
MCHC RBC-ENTMCNC: 30.3 PG — SIGNIFICANT CHANGE UP (ref 27–34)
MCHC RBC-ENTMCNC: 34.7 GM/DL — SIGNIFICANT CHANGE UP (ref 32–36)
MCV RBC AUTO: 87.3 FL — SIGNIFICANT CHANGE UP (ref 80–100)
MONOCYTES # BLD AUTO: 0.8 K/UL — SIGNIFICANT CHANGE UP (ref 0–0.9)
MONOCYTES NFR BLD AUTO: 6.7 % — SIGNIFICANT CHANGE UP (ref 2–14)
NEUTROPHILS # BLD AUTO: 7.2 K/UL — SIGNIFICANT CHANGE UP (ref 1.8–7.4)
NEUTROPHILS NFR BLD AUTO: 62.5 % — SIGNIFICANT CHANGE UP (ref 43–77)
PCO2 BLDV: 44 MMHG — SIGNIFICANT CHANGE UP (ref 35–50)
PH BLDV: 7.37 — SIGNIFICANT CHANGE UP (ref 7.35–7.45)
PLATELET # BLD AUTO: 202 K/UL — SIGNIFICANT CHANGE UP (ref 150–400)
PO2 BLDV: 36 MMHG — SIGNIFICANT CHANGE UP (ref 25–45)
POTASSIUM BLDV-SCNC: 3.9 MMOL/L — SIGNIFICANT CHANGE UP (ref 3.5–5.3)
POTASSIUM SERPL-MCNC: 4 MMOL/L — SIGNIFICANT CHANGE UP (ref 3.5–5.3)
POTASSIUM SERPL-SCNC: 4 MMOL/L — SIGNIFICANT CHANGE UP (ref 3.5–5.3)
PROT SERPL-MCNC: 7.6 G/DL — SIGNIFICANT CHANGE UP (ref 6–8.3)
PROTHROM AB SERPL-ACNC: 10.9 SEC — SIGNIFICANT CHANGE UP (ref 9.8–12.7)
RBC # BLD: 4.66 M/UL — SIGNIFICANT CHANGE UP (ref 3.8–5.2)
RBC # FLD: 12.6 % — SIGNIFICANT CHANGE UP (ref 10.3–14.5)
SAO2 % BLDV: 64 % — LOW (ref 67–88)
SODIUM SERPL-SCNC: 137 MMOL/L — SIGNIFICANT CHANGE UP (ref 135–145)
TSH SERPL-MCNC: 2.47 UIU/ML — SIGNIFICANT CHANGE UP (ref 0.27–4.2)
WBC # BLD: 11.6 K/UL — HIGH (ref 3.8–10.5)
WBC # FLD AUTO: 11.6 K/UL — HIGH (ref 3.8–10.5)

## 2018-08-19 PROCEDURE — 85730 THROMBOPLASTIN TIME PARTIAL: CPT

## 2018-08-19 PROCEDURE — 82565 ASSAY OF CREATININE: CPT

## 2018-08-19 PROCEDURE — 84443 ASSAY THYROID STIM HORMONE: CPT

## 2018-08-19 PROCEDURE — 82962 GLUCOSE BLOOD TEST: CPT

## 2018-08-19 PROCEDURE — 84295 ASSAY OF SERUM SODIUM: CPT

## 2018-08-19 PROCEDURE — 85610 PROTHROMBIN TIME: CPT

## 2018-08-19 PROCEDURE — 82803 BLOOD GASES ANY COMBINATION: CPT

## 2018-08-19 PROCEDURE — 85014 HEMATOCRIT: CPT

## 2018-08-19 PROCEDURE — 83605 ASSAY OF LACTIC ACID: CPT

## 2018-08-19 PROCEDURE — 93010 ELECTROCARDIOGRAM REPORT: CPT

## 2018-08-19 PROCEDURE — 85027 COMPLETE CBC AUTOMATED: CPT

## 2018-08-19 PROCEDURE — 99284 EMERGENCY DEPT VISIT MOD MDM: CPT | Mod: 25

## 2018-08-19 PROCEDURE — 82330 ASSAY OF CALCIUM: CPT

## 2018-08-19 PROCEDURE — 82947 ASSAY GLUCOSE BLOOD QUANT: CPT

## 2018-08-19 PROCEDURE — 84132 ASSAY OF SERUM POTASSIUM: CPT

## 2018-08-19 PROCEDURE — 99283 EMERGENCY DEPT VISIT LOW MDM: CPT

## 2018-08-19 PROCEDURE — 93005 ELECTROCARDIOGRAM TRACING: CPT

## 2018-08-19 PROCEDURE — 83036 HEMOGLOBIN GLYCOSYLATED A1C: CPT

## 2018-08-19 PROCEDURE — 82435 ASSAY OF BLOOD CHLORIDE: CPT

## 2018-08-19 PROCEDURE — 80053 COMPREHEN METABOLIC PANEL: CPT

## 2018-08-19 NOTE — ED ADULT NURSE NOTE - OBJECTIVE STATEMENT
37 y.o F arrived to ED c/o episodes of hypoglycemia for the past 4 days. A&Ox3. Pt is  currently 20 weeks pregnant with hx of gestational diabetes. does not have a regular OBGYN, states she goes to the clinic for care. Pt states she has been feeling intermittently dizzy /diaphoretic/weak. presented to [facility] 3 days ago and had full workup. prior to presenting to [facility] pt states her  suggested pt check her blood sugar, pt used her mother in law's glucometer found sugar to be low and presented to [facility]. pt states she has been having snacks/juice every few hours to avoid her sugar dropping. came to ED today for persistent episodes of hypoglycemia. pt states she feels episodes coming on, states when she gets them she becomes diaphoretic, SOB, dizzy, her heart rate increases, and she gets mild upper abdominal pain. IN ED FS performed - 133. respirations nonlabored, pt in no acute distress, is anxious - pt state she has a history of anxiety. neuro intact, able to move all extremities. currently pt denies CP, SOB, N/V/D, fevers, chills, HA, dizziness, pain/burning upon urination. Safety and comfort provided/maintained.

## 2018-08-19 NOTE — ED ADULT NURSE NOTE - NSIMPLEMENTINTERV_GEN_ALL_ED
Implemented All Universal Safety Interventions:  [place] to call system. Call bell, personal items and telephone within reach. Instruct patient to call for assistance. Room bathroom lighting operational. Non-slip footwear when patient is off stretcher. Physically safe environment: no spills, clutter or unnecessary equipment. Stretcher in lowest position, wheels locked, appropriate side rails in place.

## 2018-08-19 NOTE — ED PROVIDER NOTE - CARE PLAN
Principal Discharge DX:	Dizziness Principal Discharge DX:	Dizziness  Assessment and plan of treatment:	You were seen in the Emergency Department for low blood sugar at home.   1) Advance activity as tolerated.  Drink plenty of fluids.  2) Continue all previously prescribed medications as directed.    3) Follow up with OB/GYN Clinic this week ([phone]).   4) Return to the Emergency Department for worsening or persistent symptoms, and/or ANY NEW OR CONCERNING SYMPTOMS. If you have issues obtaining follow up, please call: [phone] to obtain a doctor or specialist who takes your insurance in your area.

## 2018-08-19 NOTE — ED PROVIDER NOTE - PROGRESS NOTE DETAILS
D/w OB who report no OB issues at this time, recommend FHR o/w proceed with normal dizziness eval. [facility] PGY2 Freddie Garrido M.D. Resident: , patient feels well at this time, will recheck FS and have patient follow up in OB clinic. Freddie Garrido M.D. Resident: FS at 19:45 is 100. Pt denies dizziness, lightheadedness. Will DC and provide information for [facility] follow up.

## 2018-08-19 NOTE — ED PROVIDER NOTE - PHYSICAL EXAMINATION
Gen: NAD, non-toxic, conversational  Eyes: PERR, EOMI   HENT: Normocephalic, atraumatic. External ears normal, no rhinorrhea, moist mucous membranes.   CV: RRR, no M/R/G  Resp: CTAB, non-labored, speaking without difficulty on room air  Abd: soft, non tender, non rigid, no guarding or rebound tenderness  Back: No CVAT bilaterally, no midline ttp  Skin: dry, wwp   Neuro: AOx3, speech is fluent and appropriate  Psych: Mood concerned, affect euthymic

## 2018-08-19 NOTE — ED PROVIDER NOTE - OBJECTIVE STATEMENT
A1 presenting for evaluation of dizzy spells, notes that over the past 5 days she has had instances of hypoglycemia at home. Has no hx of this in the past, notes that she does not take any insulin or diabetes medications. Has noticed that she has had blood sugars in the 40s-60s while at home every 2-3 hours, stating that she uses her mother in laws glucometer to check. Is not sure why she is having these low blood sugar levels, no recent infections, no hx of this problem, no recent infections or other illnesses. +tactile fevers, chills, no measured temperatures.

## 2018-08-19 NOTE — ED ADULT TRIAGE NOTE - CCCP TRG CHIEF CMPLNT
feel faint/dizzy/sugar keeps dropping, taking glucose pills every half hour/pain under breasts and upper quadrants/20 weeks pregnant/FS in

## 2018-08-19 NOTE — ED PROVIDER NOTE - PLAN OF CARE
You were seen in the Emergency Department for low blood sugar at home.   1) Advance activity as tolerated.  Drink plenty of fluids.  2) Continue all previously prescribed medications as directed.    3) Follow up with OB/GYN Clinic this week ([phone]).   4) Return to the Emergency Department for worsening or persistent symptoms, and/or ANY NEW OR CONCERNING SYMPTOMS. If you have issues obtaining follow up, please call: [phone] to obtain a doctor or specialist who takes your insurance in your area.

## 2018-08-19 NOTE — ED PROVIDER NOTE - ATTENDING CONTRIBUTION TO CARE
Patient  at 20 weeks, follows at [facility] OBGYN presenting reporting recurrent dizzy spells at home.  First noticed this a few days ago, checked her blood sugar at home on a family members glucometer and saw it was low - took sugar tabs and felt better.  Reporting since that point having recurrent episodes of feeling lightheaded (without associated LOC, chest pains, SOB) which improve with glucose tabs.  Seen at [facility] 4 days ago for same with negative labs, stable serial blood sugars and discharged.    On exam patient vital signs within normal limits, well appearing, anxious, RRR S1/S2, lungs clear to ascultation bilaterally, abdomen soft, non tender, gravid.    Cause of recurrent hypoglycemia unclear, patient denying taking any oral diabetic medications - will repeat screening labs, HgbA1c, consult OBGYN.

## 2018-08-19 NOTE — ED PROVIDER NOTE - MEDICAL DECISION MAKING DETAILS
37F A0 18 weeks pregnant with 4-5 days of dizziness at home, has been evaluated for this recently at [facility] with negative workup, will repeat studies, follow up results, reassess, dispo.

## 2018-10-01 ENCOUNTER — LABORATORY RESULT (OUTPATIENT)
Age: 38
End: 2018-10-01

## 2018-10-01 ENCOUNTER — RESULT CHARGE (OUTPATIENT)
Age: 38
End: 2018-10-01

## 2018-10-01 ENCOUNTER — APPOINTMENT (OUTPATIENT)
Dept: OBGYN | Facility: CLINIC | Age: 38
End: 2018-10-01
Payer: MEDICAID

## 2018-10-01 ENCOUNTER — OUTPATIENT (OUTPATIENT)
Dept: OUTPATIENT SERVICES | Facility: HOSPITAL | Age: 38
LOS: 1 days | End: 2018-10-01
Payer: MEDICAID

## 2018-10-01 VITALS — DIASTOLIC BLOOD PRESSURE: 76 MMHG | BODY MASS INDEX: 35.7 KG/M2 | WEIGHT: 208 LBS | SYSTOLIC BLOOD PRESSURE: 112 MMHG

## 2018-10-01 DIAGNOSIS — Z34.80 ENCOUNTER FOR SUPERVISION OF OTHER NORMAL PREGNANCY, UNSPECIFIED TRIMESTER: ICD-10-CM

## 2018-10-01 LAB
HCG UR QL: NEGATIVE
QUALITY CONTROL: NORMAL

## 2018-10-01 PROCEDURE — G0463: CPT

## 2018-10-01 PROCEDURE — 86900 BLOOD TYPING SEROLOGIC ABO: CPT

## 2018-10-01 PROCEDURE — 81003 URINALYSIS AUTO W/O SCOPE: CPT

## 2018-10-01 PROCEDURE — 81003 URINALYSIS AUTO W/O SCOPE: CPT | Mod: NC,QW

## 2018-10-01 PROCEDURE — 86850 RBC ANTIBODY SCREEN: CPT

## 2018-10-01 PROCEDURE — 99203 OFFICE O/P NEW LOW 30 MIN: CPT | Mod: NC

## 2018-10-01 PROCEDURE — 85027 COMPLETE CBC AUTOMATED: CPT

## 2018-10-02 ENCOUNTER — NON-APPOINTMENT (OUTPATIENT)
Age: 38
End: 2018-10-02

## 2018-10-02 LAB
HCT VFR BLD CALC: 38.5 % — SIGNIFICANT CHANGE UP (ref 34.5–45)
HGB BLD-MCNC: 13 G/DL — SIGNIFICANT CHANGE UP (ref 11.5–15.5)
MCHC RBC-ENTMCNC: 29.6 PG — SIGNIFICANT CHANGE UP (ref 27–34)
MCHC RBC-ENTMCNC: 33.8 GM/DL — SIGNIFICANT CHANGE UP (ref 32–36)
MCV RBC AUTO: 87.7 FL — SIGNIFICANT CHANGE UP (ref 80–100)
PLATELET # BLD AUTO: 202 K/UL — SIGNIFICANT CHANGE UP (ref 150–400)
RBC # BLD: 4.39 M/UL — SIGNIFICANT CHANGE UP (ref 3.8–5.2)
RBC # FLD: 14.5 % — SIGNIFICANT CHANGE UP (ref 10.3–14.5)
WBC # BLD: 9.88 K/UL — SIGNIFICANT CHANGE UP (ref 3.8–10.5)
WBC # FLD AUTO: 9.88 K/UL — SIGNIFICANT CHANGE UP (ref 3.8–10.5)

## 2018-10-03 ENCOUNTER — APPOINTMENT (OUTPATIENT)
Dept: ANTEPARTUM | Facility: CLINIC | Age: 38
End: 2018-10-03
Payer: MEDICAID

## 2018-10-03 ENCOUNTER — ASOB RESULT (OUTPATIENT)
Age: 38
End: 2018-10-03

## 2018-10-03 PROCEDURE — 76805 OB US >/= 14 WKS SNGL FETUS: CPT

## 2018-10-15 ENCOUNTER — LABORATORY RESULT (OUTPATIENT)
Age: 38
End: 2018-10-15

## 2018-10-15 ENCOUNTER — OUTPATIENT (OUTPATIENT)
Dept: OUTPATIENT SERVICES | Facility: HOSPITAL | Age: 38
LOS: 1 days | End: 2018-10-15
Payer: MEDICAID

## 2018-10-15 ENCOUNTER — APPOINTMENT (OUTPATIENT)
Dept: OBGYN | Facility: CLINIC | Age: 38
End: 2018-10-15
Payer: MEDICAID

## 2018-10-15 VITALS — BODY MASS INDEX: 35.53 KG/M2 | WEIGHT: 207 LBS | SYSTOLIC BLOOD PRESSURE: 118 MMHG | DIASTOLIC BLOOD PRESSURE: 80 MMHG

## 2018-10-15 DIAGNOSIS — Z34.90 ENCOUNTER FOR SUPERVISION OF NORMAL PREGNANCY, UNSPECIFIED, UNSPECIFIED TRIMESTER: ICD-10-CM

## 2018-10-15 DIAGNOSIS — Z34.80 ENCOUNTER FOR SUPERVISION OF OTHER NORMAL PREGNANCY, UNSPECIFIED TRIMESTER: ICD-10-CM

## 2018-10-15 DIAGNOSIS — E16.2 HYPOGLYCEMIA, UNSPECIFIED: ICD-10-CM

## 2018-10-15 PROCEDURE — 81002 URINALYSIS NONAUTO W/O SCOPE: CPT | Mod: NC

## 2018-10-15 PROCEDURE — 99213 OFFICE O/P EST LOW 20 MIN: CPT | Mod: NC

## 2018-10-16 ENCOUNTER — RESULT REVIEW (OUTPATIENT)
Age: 38
End: 2018-10-16

## 2018-10-16 DIAGNOSIS — R73.9 HYPERGLYCEMIA, UNSPECIFIED: ICD-10-CM

## 2018-10-16 LAB — GLUCOSE 1H P MEAL SERPL-MCNC: 163 MG/DL — HIGH (ref 70–134)

## 2018-10-17 ENCOUNTER — LABORATORY RESULT (OUTPATIENT)
Age: 38
End: 2018-10-17

## 2018-10-17 LAB
GAMMA INTERFERON BACKGROUND BLD IA-ACNC: 0.03 IU/ML — SIGNIFICANT CHANGE UP
M TB IFN-G BLD-IMP: NEGATIVE — SIGNIFICANT CHANGE UP
M TB IFN-G CD4+ BCKGRND COR BLD-ACNC: 0.01 IU/ML — SIGNIFICANT CHANGE UP
M TB IFN-G CD4+CD8+ BCKGRND COR BLD-ACNC: 0 IU/ML — SIGNIFICANT CHANGE UP
QUANT TB PLUS MITOGEN MINUS NIL: >10 IU/ML — SIGNIFICANT CHANGE UP

## 2018-10-17 PROCEDURE — 81002 URINALYSIS NONAUTO W/O SCOPE: CPT

## 2018-10-17 PROCEDURE — 86480 TB TEST CELL IMMUN MEASURE: CPT

## 2018-10-17 PROCEDURE — 82950 GLUCOSE TEST: CPT

## 2018-10-17 PROCEDURE — 82951 GLUCOSE TOLERANCE TEST (GTT): CPT

## 2018-10-17 PROCEDURE — G0463: CPT

## 2018-10-17 PROCEDURE — 86900 BLOOD TYPING SEROLOGIC ABO: CPT

## 2018-10-17 PROCEDURE — 81220 CFTR GENE COM VARIANTS: CPT

## 2018-10-18 LAB
GLUCOSE 1H P GLC SERPL-MCNC: 165 MG/DL — SIGNIFICANT CHANGE UP (ref 70–199)
GLUCOSE 2H P GLC SERPL-MCNC: 137 MG/DL — SIGNIFICANT CHANGE UP (ref 70–139)
GLUCOSE 3H P GLC SERPL-MCNC: 116 MG/DL — SIGNIFICANT CHANGE UP
GLUCOSE P FAST BLDV-MCNC: 81 MG/DL — SIGNIFICANT CHANGE UP (ref 70–99)

## 2018-10-21 LAB — CYSTIC FIBROSIS EXPANDED PANEL: SIGNIFICANT CHANGE UP

## 2018-10-24 DIAGNOSIS — Z34.92 ENCOUNTER FOR SUPERVISION OF NORMAL PREGNANCY, UNSPECIFIED, SECOND TRIMESTER: ICD-10-CM

## 2018-10-25 ENCOUNTER — APPOINTMENT (OUTPATIENT)
Dept: OBGYN | Facility: CLINIC | Age: 38
End: 2018-10-25

## 2018-10-30 ENCOUNTER — LABORATORY RESULT (OUTPATIENT)
Age: 38
End: 2018-10-30

## 2018-10-30 ENCOUNTER — OUTPATIENT (OUTPATIENT)
Dept: OUTPATIENT SERVICES | Facility: HOSPITAL | Age: 38
LOS: 1 days | End: 2018-10-30
Payer: MEDICAID

## 2018-10-30 ENCOUNTER — NON-APPOINTMENT (OUTPATIENT)
Age: 38
End: 2018-10-30

## 2018-10-30 ENCOUNTER — APPOINTMENT (OUTPATIENT)
Dept: OBGYN | Facility: CLINIC | Age: 38
End: 2018-10-30
Payer: MEDICAID

## 2018-10-30 VITALS — BODY MASS INDEX: 36.39 KG/M2 | SYSTOLIC BLOOD PRESSURE: 108 MMHG | WEIGHT: 212 LBS | DIASTOLIC BLOOD PRESSURE: 72 MMHG

## 2018-10-30 DIAGNOSIS — O24.419 GESTATIONAL DIABETES MELLITUS IN PREGNANCY, UNSPECIFIED CONTROL: ICD-10-CM

## 2018-10-30 DIAGNOSIS — Z34.80 ENCOUNTER FOR SUPERVISION OF OTHER NORMAL PREGNANCY, UNSPECIFIED TRIMESTER: ICD-10-CM

## 2018-10-30 DIAGNOSIS — O09.899 SUPERVISION OF OTHER HIGH RISK PREGNANCIES, UNSPECIFIED TRIMESTER: ICD-10-CM

## 2018-10-30 DIAGNOSIS — Z67.91 SUPERVISION OF OTHER HIGH RISK PREGNANCIES, UNSPECIFIED TRIMESTER: ICD-10-CM

## 2018-10-30 LAB
HCT VFR BLD CALC: 37.9 % — SIGNIFICANT CHANGE UP (ref 34.5–45)
HGB BLD-MCNC: 13.2 G/DL — SIGNIFICANT CHANGE UP (ref 11.5–15.5)
MCHC RBC-ENTMCNC: 29.6 PG — SIGNIFICANT CHANGE UP (ref 27–34)
MCHC RBC-ENTMCNC: 34.8 GM/DL — SIGNIFICANT CHANGE UP (ref 32–36)
MCV RBC AUTO: 85 FL — SIGNIFICANT CHANGE UP (ref 80–100)
PLATELET # BLD AUTO: 204 K/UL — SIGNIFICANT CHANGE UP (ref 150–400)
RBC # BLD: 4.46 M/UL — SIGNIFICANT CHANGE UP (ref 3.8–5.2)
RBC # FLD: 14.5 % — SIGNIFICANT CHANGE UP (ref 10.3–14.5)
WBC # BLD: 10.42 K/UL — SIGNIFICANT CHANGE UP (ref 3.8–10.5)
WBC # FLD AUTO: 10.42 K/UL — SIGNIFICANT CHANGE UP (ref 3.8–10.5)

## 2018-10-30 PROCEDURE — 81003 URINALYSIS AUTO W/O SCOPE: CPT | Mod: NC,QW

## 2018-10-30 PROCEDURE — G0463: CPT

## 2018-10-30 PROCEDURE — 81003 URINALYSIS AUTO W/O SCOPE: CPT

## 2018-10-30 PROCEDURE — 99213 OFFICE O/P EST LOW 20 MIN: CPT | Mod: NC

## 2018-10-30 PROCEDURE — 86780 TREPONEMA PALLIDUM: CPT

## 2018-10-30 PROCEDURE — 87389 HIV-1 AG W/HIV-1&-2 AB AG IA: CPT

## 2018-10-30 PROCEDURE — 85027 COMPLETE CBC AUTOMATED: CPT

## 2018-10-30 PROCEDURE — 86900 BLOOD TYPING SEROLOGIC ABO: CPT

## 2018-10-31 LAB
HIV 1+2 AB+HIV1 P24 AG SERPL QL IA: SIGNIFICANT CHANGE UP
T PALLIDUM AB TITR SER: NEGATIVE — SIGNIFICANT CHANGE UP

## 2018-11-05 DIAGNOSIS — Z34.90 ENCOUNTER FOR SUPERVISION OF NORMAL PREGNANCY, UNSPECIFIED, UNSPECIFIED TRIMESTER: ICD-10-CM

## 2018-11-05 DIAGNOSIS — Z31.83 ENCOUNTER FOR ASSISTED REPRODUCTIVE FERTILITY PROCEDURE CYCLE: ICD-10-CM

## 2018-11-05 DIAGNOSIS — O24.419 GESTATIONAL DIABETES MELLITUS IN PREGNANCY, UNSPECIFIED CONTROL: ICD-10-CM

## 2018-11-14 ENCOUNTER — APPOINTMENT (OUTPATIENT)
Dept: ANTEPARTUM | Facility: CLINIC | Age: 38
End: 2018-11-14
Payer: MEDICAID

## 2018-11-14 ENCOUNTER — OUTPATIENT (OUTPATIENT)
Dept: OUTPATIENT SERVICES | Facility: HOSPITAL | Age: 38
LOS: 1 days | End: 2018-11-14
Payer: MEDICAID

## 2018-11-14 ENCOUNTER — ASOB RESULT (OUTPATIENT)
Age: 38
End: 2018-11-14

## 2018-11-14 ENCOUNTER — OTHER (OUTPATIENT)
Age: 38
End: 2018-11-14

## 2018-11-14 ENCOUNTER — APPOINTMENT (OUTPATIENT)
Dept: OBGYN | Facility: CLINIC | Age: 38
End: 2018-11-14
Payer: MEDICAID

## 2018-11-14 VITALS — SYSTOLIC BLOOD PRESSURE: 110 MMHG | WEIGHT: 214 LBS | DIASTOLIC BLOOD PRESSURE: 70 MMHG | BODY MASS INDEX: 36.73 KG/M2

## 2018-11-14 DIAGNOSIS — Z31.83 ENCOUNTER FOR ASSISTED REPRODUCTIVE FERTILITY PROCEDURE CYCLE: ICD-10-CM

## 2018-11-14 DIAGNOSIS — Z34.80 ENCOUNTER FOR SUPERVISION OF OTHER NORMAL PREGNANCY, UNSPECIFIED TRIMESTER: ICD-10-CM

## 2018-11-14 DIAGNOSIS — Z53.20 PROCEDURE AND TREATMENT NOT CARRIED OUT BECAUSE OF PATIENT'S DECISION FOR UNSPECIFIED REASONS: ICD-10-CM

## 2018-11-14 DIAGNOSIS — Z67.91 UNSPECIFIED BLOOD TYPE, RH NEGATIVE: ICD-10-CM

## 2018-11-14 DIAGNOSIS — Z34.90 ENCOUNTER FOR SUPERVISION OF NORMAL PREGNANCY, UNSPECIFIED, UNSPECIFIED TRIMESTER: ICD-10-CM

## 2018-11-14 PROCEDURE — 99214 OFFICE O/P EST MOD 30 MIN: CPT | Mod: NC

## 2018-11-14 PROCEDURE — 81003 URINALYSIS AUTO W/O SCOPE: CPT | Mod: NC,QW

## 2018-11-14 PROCEDURE — 76816 OB US FOLLOW-UP PER FETUS: CPT

## 2018-11-14 PROCEDURE — G0463: CPT

## 2018-11-14 PROCEDURE — 81003 URINALYSIS AUTO W/O SCOPE: CPT

## 2018-11-26 ENCOUNTER — APPOINTMENT (OUTPATIENT)
Dept: ANTEPARTUM | Facility: CLINIC | Age: 38
End: 2018-11-26

## 2018-11-28 ENCOUNTER — APPOINTMENT (OUTPATIENT)
Dept: OBGYN | Facility: CLINIC | Age: 38
End: 2018-11-28

## 2018-11-29 ENCOUNTER — NON-APPOINTMENT (OUTPATIENT)
Age: 38
End: 2018-11-29

## 2018-12-29 ENCOUNTER — OUTPATIENT (OUTPATIENT)
Dept: OUTPATIENT SERVICES | Facility: HOSPITAL | Age: 38
LOS: 1 days | End: 2018-12-29

## 2018-12-29 DIAGNOSIS — O26.899 OTHER SPECIFIED PREGNANCY RELATED CONDITIONS, UNSPECIFIED TRIMESTER: ICD-10-CM

## 2018-12-29 DIAGNOSIS — Z3A.00 WEEKS OF GESTATION OF PREGNANCY NOT SPECIFIED: ICD-10-CM

## 2019-01-01 ENCOUNTER — OUTPATIENT (OUTPATIENT)
Dept: OUTPATIENT SERVICES | Facility: HOSPITAL | Age: 39
LOS: 1 days | End: 2019-01-01
Payer: MEDICAID

## 2019-01-01 PROCEDURE — G9001: CPT

## 2019-01-10 ENCOUNTER — INPATIENT (INPATIENT)
Facility: HOSPITAL | Age: 39
LOS: 1 days | Discharge: HOME | End: 2019-01-12
Attending: SPECIALIST | Admitting: SPECIALIST

## 2019-01-10 VITALS
TEMPERATURE: 98 F | RESPIRATION RATE: 18 BRPM | HEART RATE: 111 BPM | DIASTOLIC BLOOD PRESSURE: 59 MMHG | SYSTOLIC BLOOD PRESSURE: 120 MMHG | OXYGEN SATURATION: 99 %

## 2019-01-10 DIAGNOSIS — Z3A.00 WEEKS OF GESTATION OF PREGNANCY NOT SPECIFIED: ICD-10-CM

## 2019-01-10 DIAGNOSIS — O26.90 PREGNANCY RELATED CONDITIONS, UNSPECIFIED, UNSPECIFIED TRIMESTER: ICD-10-CM

## 2019-01-10 LAB
BASOPHILS # BLD AUTO: 0.04 K/UL — SIGNIFICANT CHANGE UP (ref 0–0.2)
BASOPHILS NFR BLD AUTO: 0.4 % — SIGNIFICANT CHANGE UP (ref 0–2)
BLD GP AB SCN SERPL QL: NEGATIVE — SIGNIFICANT CHANGE UP
EOSINOPHIL # BLD AUTO: 0.07 K/UL — SIGNIFICANT CHANGE UP (ref 0–0.5)
EOSINOPHIL NFR BLD AUTO: 0.6 % — SIGNIFICANT CHANGE UP (ref 0–6)
HBV SURFACE AG SER-ACNC: NEGATIVE — SIGNIFICANT CHANGE UP
HCT VFR BLD CALC: 37.3 % — SIGNIFICANT CHANGE UP (ref 34.5–45)
HCT VFR BLD CALC: 41.7 % — SIGNIFICANT CHANGE UP (ref 34.5–45)
HGB BLD-MCNC: 12.5 G/DL — SIGNIFICANT CHANGE UP (ref 11.5–15.5)
HGB BLD-MCNC: 13.5 G/DL — SIGNIFICANT CHANGE UP (ref 11.5–15.5)
IMM GRANULOCYTES NFR BLD AUTO: 0.8 % — SIGNIFICANT CHANGE UP (ref 0–1.5)
LYMPHOCYTES # BLD AUTO: 2.28 K/UL — SIGNIFICANT CHANGE UP (ref 1–3.3)
LYMPHOCYTES # BLD AUTO: 20.6 % — SIGNIFICANT CHANGE UP (ref 13–44)
MCHC RBC-ENTMCNC: 26.3 PG — LOW (ref 27–34)
MCHC RBC-ENTMCNC: 27.1 PG — SIGNIFICANT CHANGE UP (ref 27–34)
MCHC RBC-ENTMCNC: 32.4 % — SIGNIFICANT CHANGE UP (ref 32–36)
MCHC RBC-ENTMCNC: 33.5 % — SIGNIFICANT CHANGE UP (ref 32–36)
MCV RBC AUTO: 80.7 FL — SIGNIFICANT CHANGE UP (ref 80–100)
MCV RBC AUTO: 81.3 FL — SIGNIFICANT CHANGE UP (ref 80–100)
MONOCYTES # BLD AUTO: 0.74 K/UL — SIGNIFICANT CHANGE UP (ref 0–0.9)
MONOCYTES NFR BLD AUTO: 6.7 % — SIGNIFICANT CHANGE UP (ref 2–14)
NEUTROPHILS # BLD AUTO: 7.83 K/UL — HIGH (ref 1.8–7.4)
NEUTROPHILS NFR BLD AUTO: 70.9 % — SIGNIFICANT CHANGE UP (ref 43–77)
NRBC # FLD: 0 K/UL — LOW (ref 25–125)
NRBC # FLD: 0 K/UL — LOW (ref 25–125)
PLATELET # BLD AUTO: 182 K/UL — SIGNIFICANT CHANGE UP (ref 150–400)
PLATELET # BLD AUTO: 202 K/UL — SIGNIFICANT CHANGE UP (ref 150–400)
PMV BLD: 11.3 FL — SIGNIFICANT CHANGE UP (ref 7–13)
PMV BLD: 11.7 FL — SIGNIFICANT CHANGE UP (ref 7–13)
RBC # BLD: 4.62 M/UL — SIGNIFICANT CHANGE UP (ref 3.8–5.2)
RBC # BLD: 5.13 M/UL — SIGNIFICANT CHANGE UP (ref 3.8–5.2)
RBC # FLD: 15.2 % — HIGH (ref 10.3–14.5)
RBC # FLD: 15.3 % — HIGH (ref 10.3–14.5)
RH IG SCN BLD-IMP: NEGATIVE — SIGNIFICANT CHANGE UP
RH IG SCN BLD-IMP: NEGATIVE — SIGNIFICANT CHANGE UP
RUBV IGG SER-ACNC: 27.5 INDEX — SIGNIFICANT CHANGE UP
RUBV IGG SER-IMP: POSITIVE — SIGNIFICANT CHANGE UP
T PALLIDUM AB TITR SER: NEGATIVE — SIGNIFICANT CHANGE UP
WBC # BLD: 11.05 K/UL — HIGH (ref 3.8–10.5)
WBC # BLD: 15.63 K/UL — HIGH (ref 3.8–10.5)
WBC # FLD AUTO: 11.05 K/UL — HIGH (ref 3.8–10.5)
WBC # FLD AUTO: 15.63 K/UL — HIGH (ref 3.8–10.5)

## 2019-01-10 RX ORDER — IBUPROFEN 200 MG
600 TABLET ORAL EVERY 6 HOURS
Qty: 0 | Refills: 0 | Status: DISCONTINUED | OUTPATIENT
Start: 2019-01-10 | End: 2019-01-10

## 2019-01-10 RX ORDER — SIMETHICONE 80 MG/1
80 TABLET, CHEWABLE ORAL EVERY 6 HOURS
Qty: 0 | Refills: 0 | Status: DISCONTINUED | OUTPATIENT
Start: 2019-01-10 | End: 2019-01-10

## 2019-01-10 RX ORDER — HYDROCORTISONE 1 %
1 OINTMENT (GRAM) TOPICAL EVERY 4 HOURS
Qty: 0 | Refills: 0 | Status: DISCONTINUED | OUTPATIENT
Start: 2019-01-10 | End: 2019-01-11

## 2019-01-10 RX ORDER — OXYTOCIN 10 UNIT/ML
41.67 VIAL (ML) INJECTION
Qty: 20 | Refills: 0 | Status: DISCONTINUED | OUTPATIENT
Start: 2019-01-10 | End: 2019-01-10

## 2019-01-10 RX ORDER — OXYTOCIN 10 UNIT/ML
41.67 VIAL (ML) INJECTION
Qty: 20 | Refills: 0 | Status: DISCONTINUED | OUTPATIENT
Start: 2019-01-10 | End: 2019-01-11

## 2019-01-10 RX ORDER — GLYCERIN ADULT
1 SUPPOSITORY, RECTAL RECTAL AT BEDTIME
Qty: 0 | Refills: 0 | Status: DISCONTINUED | OUTPATIENT
Start: 2019-01-10 | End: 2019-01-12

## 2019-01-10 RX ORDER — MAGNESIUM HYDROXIDE 400 MG/1
30 TABLET, CHEWABLE ORAL
Qty: 0 | Refills: 0 | Status: DISCONTINUED | OUTPATIENT
Start: 2019-01-10 | End: 2019-01-10

## 2019-01-10 RX ORDER — TETANUS TOXOID, REDUCED DIPHTHERIA TOXOID AND ACELLULAR PERTUSSIS VACCINE, ADSORBED 5; 2.5; 8; 8; 2.5 [IU]/.5ML; [IU]/.5ML; UG/.5ML; UG/.5ML; UG/.5ML
0.5 SUSPENSION INTRAMUSCULAR ONCE
Qty: 0 | Refills: 0 | Status: DISCONTINUED | OUTPATIENT
Start: 2019-01-10 | End: 2019-01-11

## 2019-01-10 RX ORDER — BUTORPHANOL TARTRATE 2 MG/ML
2 INJECTION, SOLUTION INTRAMUSCULAR; INTRAVENOUS ONCE
Qty: 0 | Refills: 0 | Status: DISCONTINUED | OUTPATIENT
Start: 2019-01-10 | End: 2019-01-10

## 2019-01-10 RX ORDER — SODIUM CHLORIDE 9 MG/ML
1000 INJECTION, SOLUTION INTRAVENOUS
Qty: 0 | Refills: 0 | Status: DISCONTINUED | OUTPATIENT
Start: 2019-01-10 | End: 2019-01-10

## 2019-01-10 RX ORDER — ACETAMINOPHEN 500 MG
975 TABLET ORAL EVERY 6 HOURS
Qty: 0 | Refills: 0 | Status: DISCONTINUED | OUTPATIENT
Start: 2019-01-10 | End: 2019-01-10

## 2019-01-10 RX ORDER — PRAMOXINE HYDROCHLORIDE 150 MG/15G
1 AEROSOL, FOAM RECTAL EVERY 4 HOURS
Qty: 0 | Refills: 0 | Status: DISCONTINUED | OUTPATIENT
Start: 2019-01-10 | End: 2019-01-10

## 2019-01-10 RX ORDER — ZOLPIDEM TARTRATE 10 MG/1
5 TABLET ORAL AT BEDTIME
Qty: 0 | Refills: 0 | Status: DISCONTINUED | OUTPATIENT
Start: 2019-01-10 | End: 2019-01-12

## 2019-01-10 RX ORDER — IBUPROFEN 200 MG
600 TABLET ORAL EVERY 6 HOURS
Qty: 0 | Refills: 0 | Status: COMPLETED | OUTPATIENT
Start: 2019-01-10 | End: 2019-12-09

## 2019-01-10 RX ORDER — DIBUCAINE 1 %
1 OINTMENT (GRAM) RECTAL EVERY 4 HOURS
Qty: 0 | Refills: 0 | Status: DISCONTINUED | OUTPATIENT
Start: 2019-01-10 | End: 2019-01-10

## 2019-01-10 RX ORDER — TETANUS TOXOID, REDUCED DIPHTHERIA TOXOID AND ACELLULAR PERTUSSIS VACCINE, ADSORBED 5; 2.5; 8; 8; 2.5 [IU]/.5ML; [IU]/.5ML; UG/.5ML; UG/.5ML; UG/.5ML
0.5 SUSPENSION INTRAMUSCULAR ONCE
Qty: 0 | Refills: 0 | Status: DISCONTINUED | OUTPATIENT
Start: 2019-01-10 | End: 2019-01-12

## 2019-01-10 RX ORDER — ACETAMINOPHEN 500 MG
975 TABLET ORAL EVERY 6 HOURS
Qty: 0 | Refills: 0 | Status: COMPLETED | OUTPATIENT
Start: 2019-01-10 | End: 2019-12-09

## 2019-01-10 RX ORDER — LANOLIN
1 OINTMENT (GRAM) TOPICAL EVERY 6 HOURS
Qty: 0 | Refills: 0 | Status: DISCONTINUED | OUTPATIENT
Start: 2019-01-10 | End: 2019-01-10

## 2019-01-10 RX ORDER — MAGNESIUM HYDROXIDE 400 MG/1
30 TABLET, CHEWABLE ORAL
Qty: 0 | Refills: 0 | Status: DISCONTINUED | OUTPATIENT
Start: 2019-01-10 | End: 2019-01-12

## 2019-01-10 RX ORDER — OXYCODONE HYDROCHLORIDE 5 MG/1
5 TABLET ORAL
Qty: 0 | Refills: 0 | Status: DISCONTINUED | OUTPATIENT
Start: 2019-01-10 | End: 2019-01-12

## 2019-01-10 RX ORDER — OXYTOCIN 10 UNIT/ML
333.33 VIAL (ML) INJECTION
Qty: 20 | Refills: 0 | Status: DISCONTINUED | OUTPATIENT
Start: 2019-01-10 | End: 2019-01-10

## 2019-01-10 RX ORDER — AER TRAVELER 0.5 G/1
1 SOLUTION RECTAL; TOPICAL EVERY 4 HOURS
Qty: 0 | Refills: 0 | Status: DISCONTINUED | OUTPATIENT
Start: 2019-01-10 | End: 2019-01-10

## 2019-01-10 RX ORDER — LANOLIN
1 OINTMENT (GRAM) TOPICAL EVERY 6 HOURS
Qty: 0 | Refills: 0 | Status: DISCONTINUED | OUTPATIENT
Start: 2019-01-10 | End: 2019-01-12

## 2019-01-10 RX ORDER — AER TRAVELER 0.5 G/1
1 SOLUTION RECTAL; TOPICAL EVERY 4 HOURS
Qty: 0 | Refills: 0 | Status: DISCONTINUED | OUTPATIENT
Start: 2019-01-10 | End: 2019-01-12

## 2019-01-10 RX ORDER — KETOROLAC TROMETHAMINE 30 MG/ML
30 SYRINGE (ML) INJECTION ONCE
Qty: 0 | Refills: 0 | Status: DISCONTINUED | OUTPATIENT
Start: 2019-01-10 | End: 2019-01-10

## 2019-01-10 RX ORDER — SODIUM CHLORIDE 9 MG/ML
3 INJECTION INTRAMUSCULAR; INTRAVENOUS; SUBCUTANEOUS EVERY 8 HOURS
Qty: 0 | Refills: 0 | Status: DISCONTINUED | OUTPATIENT
Start: 2019-01-10 | End: 2019-01-10

## 2019-01-10 RX ORDER — OXYCODONE AND ACETAMINOPHEN 5; 325 MG/1; MG/1
2 TABLET ORAL EVERY 6 HOURS
Qty: 0 | Refills: 0 | Status: DISCONTINUED | OUTPATIENT
Start: 2019-01-10 | End: 2019-01-10

## 2019-01-10 RX ORDER — DOCUSATE SODIUM 100 MG
100 CAPSULE ORAL
Qty: 0 | Refills: 0 | Status: DISCONTINUED | OUTPATIENT
Start: 2019-01-10 | End: 2019-01-10

## 2019-01-10 RX ORDER — DOCUSATE SODIUM 100 MG
100 CAPSULE ORAL
Qty: 0 | Refills: 0 | Status: DISCONTINUED | OUTPATIENT
Start: 2019-01-10 | End: 2019-01-12

## 2019-01-10 RX ORDER — SIMETHICONE 80 MG/1
80 TABLET, CHEWABLE ORAL EVERY 6 HOURS
Qty: 0 | Refills: 0 | Status: DISCONTINUED | OUTPATIENT
Start: 2019-01-10 | End: 2019-01-12

## 2019-01-10 RX ORDER — HYDROCORTISONE 1 %
1 OINTMENT (GRAM) TOPICAL EVERY 4 HOURS
Qty: 0 | Refills: 0 | Status: DISCONTINUED | OUTPATIENT
Start: 2019-01-10 | End: 2019-01-10

## 2019-01-10 RX ORDER — INFLUENZA VIRUS VACCINE 15; 15; 15; 15 UG/.5ML; UG/.5ML; UG/.5ML; UG/.5ML
0.5 SUSPENSION INTRAMUSCULAR ONCE
Qty: 0 | Refills: 0 | Status: COMPLETED | OUTPATIENT
Start: 2019-01-10 | End: 2019-01-10

## 2019-01-10 RX ORDER — DIPHENHYDRAMINE HCL 50 MG
25 CAPSULE ORAL EVERY 6 HOURS
Qty: 0 | Refills: 0 | Status: DISCONTINUED | OUTPATIENT
Start: 2019-01-10 | End: 2019-01-12

## 2019-01-10 RX ORDER — DIPHENHYDRAMINE HCL 50 MG
25 CAPSULE ORAL EVERY 6 HOURS
Qty: 0 | Refills: 0 | Status: DISCONTINUED | OUTPATIENT
Start: 2019-01-10 | End: 2019-01-10

## 2019-01-10 RX ORDER — PRAMOXINE HYDROCHLORIDE 150 MG/15G
1 AEROSOL, FOAM RECTAL EVERY 4 HOURS
Qty: 0 | Refills: 0 | Status: DISCONTINUED | OUTPATIENT
Start: 2019-01-10 | End: 2019-01-11

## 2019-01-10 RX ORDER — DIBUCAINE 1 %
1 OINTMENT (GRAM) RECTAL EVERY 4 HOURS
Qty: 0 | Refills: 0 | Status: DISCONTINUED | OUTPATIENT
Start: 2019-01-10 | End: 2019-01-12

## 2019-01-10 RX ORDER — OXYTOCIN 10 UNIT/ML
500 VIAL (ML) INJECTION
Qty: 20 | Refills: 0 | Status: DISCONTINUED | OUTPATIENT
Start: 2019-01-10 | End: 2019-01-10

## 2019-01-10 RX ORDER — OXYCODONE HYDROCHLORIDE 5 MG/1
5 TABLET ORAL EVERY 4 HOURS
Qty: 0 | Refills: 0 | Status: DISCONTINUED | OUTPATIENT
Start: 2019-01-10 | End: 2019-01-12

## 2019-01-10 RX ORDER — SODIUM CHLORIDE 9 MG/ML
1000 INJECTION, SOLUTION INTRAVENOUS ONCE
Qty: 0 | Refills: 0 | Status: COMPLETED | OUTPATIENT
Start: 2019-01-10 | End: 2019-01-10

## 2019-01-10 RX ORDER — GLYCERIN ADULT
1 SUPPOSITORY, RECTAL RECTAL AT BEDTIME
Qty: 0 | Refills: 0 | Status: DISCONTINUED | OUTPATIENT
Start: 2019-01-10 | End: 2019-01-10

## 2019-01-10 RX ORDER — ACETAMINOPHEN 500 MG
650 TABLET ORAL EVERY 6 HOURS
Qty: 0 | Refills: 0 | Status: DISCONTINUED | OUTPATIENT
Start: 2019-01-10 | End: 2019-01-10

## 2019-01-10 RX ORDER — SODIUM CHLORIDE 9 MG/ML
3 INJECTION INTRAMUSCULAR; INTRAVENOUS; SUBCUTANEOUS EVERY 8 HOURS
Qty: 0 | Refills: 0 | Status: DISCONTINUED | OUTPATIENT
Start: 2019-01-10 | End: 2019-01-12

## 2019-01-10 RX ORDER — ACETAMINOPHEN 500 MG
650 TABLET ORAL EVERY 4 HOURS
Qty: 0 | Refills: 0 | Status: DISCONTINUED | OUTPATIENT
Start: 2019-01-10 | End: 2019-01-12

## 2019-01-10 RX ORDER — IBUPROFEN 200 MG
600 TABLET ORAL EVERY 6 HOURS
Qty: 0 | Refills: 0 | Status: DISCONTINUED | OUTPATIENT
Start: 2019-01-10 | End: 2019-01-12

## 2019-01-10 RX ADMIN — Medication 0.2 MILLIGRAM(S): at 12:29

## 2019-01-10 RX ADMIN — Medication 125 MILLIUNIT(S)/MIN: at 14:15

## 2019-01-10 RX ADMIN — SODIUM CHLORIDE 250 MILLILITER(S): 9 INJECTION, SOLUTION INTRAVENOUS at 08:30

## 2019-01-10 RX ADMIN — SODIUM CHLORIDE 2000 MILLILITER(S): 9 INJECTION, SOLUTION INTRAVENOUS at 14:00

## 2019-01-10 NOTE — PROVIDER CONTACT NOTE (OTHER) - ACTION/TREATMENT ORDERED:
patient evaluated at bedside by Dr. Lopez and Dr. Boswell, bolus of fluid 1000ml, cbc sent, laceration repair evaluated and done, sono done and straight catheter done with 750ml of urine as output

## 2019-01-10 NOTE — CHART NOTE - NSCHARTNOTEFT_GEN_A_CORE
Patient is refusing RHOGAM.  Patient's blood type is O negative, Baby blood type is A indeterminate.  Risks discussed with patient regarding not having RHOGAM will affect future pregnancies.  Patient states she is not planning on having future pregnancies since this was a pregnancy due to IVF.  Patient was educated that she may still have a possibility of having future pregnancies.  Both patient and  of patient educated.  Patient refusing RHOGAM because she says she has panic attacks when she gets injections.      d/w Dr. Metz   As per MD will speak with patient in the AM.    -Talia POOLE

## 2019-01-10 NOTE — PROVIDER CONTACT NOTE (OTHER) - BACKGROUND
patient is  postpartum with elevated HR at 125 bpm shock index of 1.1   Dr. Vo made aware of patient's bleeding and vitals signs

## 2019-01-10 NOTE — PROGRESS NOTE ADULT - SUBJECTIVE AND OBJECTIVE BOX
R1 Event Note    Pt s/p  PPD#0,  at time of delivery + 200 cc additional after though to be 2/2 superficial laceration. Went to re-eval patient one again for further bleeding.  Pt denies dizziness, loss of consciousness, CP, palpitations, SOB, dyspnea.    PE:  Vital Signs Last 24 Hrs  T(C): 36.8 (10 Jerrod 2019 12:31), Max: 36.8 (10 Jerrod 2019 12:31)  T(F): 98.2 (10 Jerrod 2019 12:31), Max: 98.2 (10 Jerrod 2019 12:31)  HR: 100 (10 Jerrod 2019 13:31) (100 - 111)  BP: 93/56 (10 Jerrod 2019 13:31) (93/56 - 120/59)  BP(mean): --  RR: 16 (10 Jerrod 2019 13:31) (16 - 18)  SpO2: 98% (10 Jerrod 2019 13:31) (98% - 99%)I&O's Detail    10 Jerrod 2019 07:01  -  10 Jerrod 2019 15:43  --------------------------------------------------------  IN:  Total IN: 0 mL    OUT:    Estimated Blood Loss: 700 mL    Indwelling Catheter - Urethral: 750 mL  Total OUT: 1450 mL    Total NET: -1450 mL        General: NAD A+Ox3  CV: S1S2 Clear. RRR.  Pulm: CTA b/l   Abd: +BS. Soft, nondistended. Appropriately tender. Fundus firm  : Bimanual exploration revealed minimal clot in the YULIET, superficial laceration oozing - repaired with single stitch 2-0 chromic  Extremities: Non-tender b/l, no edema.    Bedside sono - thin endometrial stripe, full bladder                          13.5   11.05 )-----------( 202      ( 10 Jerrod 2019 08:00 )             41.7     13.5

## 2019-01-10 NOTE — PROGRESS NOTE ADULT - PROBLEM SELECTOR PLAN 1
- Bedside sono with clear stripe  - straight cathed with 750cc urine returned  - superficial laceration repaired with single stitch  - excellent hemostasis noted  - 4 hour CBC  - 1L bolus of LR  - Continue to monitor closely    Candi Lopez PGY1  repair with RAVINDER Valente PGY3  Dr. Santiago aware

## 2019-01-10 NOTE — CHART NOTE - NSCHARTNOTEFT_GEN_A_CORE
OB Progress Note:  PPD    S: 37yo  PPD s/p  c/b EBL of 500. Provider alerted for increased bleeding. Slow trickle. Fundus firm. Bimanual exam performed about 200 cc expressed in combined with what was on pad already. Denies CP/SOB. Denies lightheadedness/dizziness. Denies N/V.    O:  Vitals:  Vital Signs Last 24 Hrs  T(C): --  T(F): --  HR: --  BP: --  BP(mean): --  RR: --  SpO2: --    MEDICATIONS  (STANDING):  acetaminophen   Tablet .. 975 milliGRAM(s) Oral every 6 hours  ibuprofen  Tablet. 600 milliGRAM(s) Oral every 6 hours  ketorolac   Injectable 30 milliGRAM(s) IV Push once  methylergonovine Injectable 0.2 milliGRAM(s) IntraMuscular once  misoprostol 1000 MICROGram(s) Rectal once  oxyCODONE    IR 5 milliGRAM(s) Oral every 3 hours  oxytocin Infusion 41.667 milliUNIT(s)/Min (125 mL/Hr) IV Continuous <Continuous>  sodium chloride 0.9% lock flush 3 milliLiter(s) IV Push every 8 hours      Labs:  Blood type: O Negative  Rubella IgG: RPR: Negative                          13.5   11.05<H> >-----------< 202    ( 01-10 @ 08:00 )             41.7                  Physical Exam:  General: NAD  Abdomen: soft, non-tender, non-distended, fundus firm  Vaginal: Lochia wnl  Extremities: No erythema/edema    A/P: 37yo PPD#1 s/p  c/b PPH.  Patient is stable and doing well post-partum.   - Pain well controlled, continue current pain regimen  -Continue to monitor bleeding and VS  - PO diet to start now  - 4 hr CBC    Yvonne Last PGY1  Dr. Fleischer and Dr. Valente at bedside  d/w Dr. Santiago

## 2019-01-11 DIAGNOSIS — Z71.89 OTHER SPECIFIED COUNSELING: ICD-10-CM

## 2019-01-11 LAB — RBC # BLD FETUS AUTO: 0 — SIGNIFICANT CHANGE UP

## 2019-01-11 RX ORDER — PRAMOXINE HYDROCHLORIDE 150 MG/15G
1 AEROSOL, FOAM RECTAL EVERY 4 HOURS
Qty: 0 | Refills: 0 | Status: DISCONTINUED | OUTPATIENT
Start: 2019-01-11 | End: 2019-01-12

## 2019-01-11 RX ADMIN — Medication 650 MILLIGRAM(S): at 06:45

## 2019-01-11 RX ADMIN — SODIUM CHLORIDE 3 MILLILITER(S): 9 INJECTION INTRAMUSCULAR; INTRAVENOUS; SUBCUTANEOUS at 01:30

## 2019-01-11 RX ADMIN — Medication 650 MILLIGRAM(S): at 12:56

## 2019-01-11 RX ADMIN — Medication 600 MILLIGRAM(S): at 02:20

## 2019-01-11 RX ADMIN — Medication 600 MILLIGRAM(S): at 01:46

## 2019-01-11 RX ADMIN — SODIUM CHLORIDE 3 MILLILITER(S): 9 INJECTION INTRAMUSCULAR; INTRAVENOUS; SUBCUTANEOUS at 17:58

## 2019-01-11 RX ADMIN — Medication 650 MILLIGRAM(S): at 06:15

## 2019-01-11 RX ADMIN — Medication 650 MILLIGRAM(S): at 13:30

## 2019-01-11 NOTE — PROGRESS NOTE ADULT - PROBLEM SELECTOR PLAN 1
- Pain well controlled, continue current pain regimen  - Increase ambulation, SCDs when not ambulating  - Continue regular diet    Candi Lopez, PGY1  Pager #[phone]

## 2019-01-11 NOTE — PROGRESS NOTE ADULT - SUBJECTIVE AND OBJECTIVE BOX
OB Progress Note:  PPD#1    S: 39yo PPD#1 s/p  c/b EBL: 500+300 for oozing laceration s/p re-repair. Patient feels well. Pain is well controlled. She is tolerating a regular diet and passing flatus. She is voiding spontaneously, and ambulating without difficulty. Denies CP/SOB. Denies HA/lightheadedness/dizziness. Denies N/V. Denies calf pain. Denies heavy vaginal bleeding.    O:  Vitals:  Vital Signs Last 24 Hrs  T(C): 36.9 (2019 06:30), Max: 36.9 (10 Jerrod 2019 18:13)  T(F): 98.4 (2019 06:30), Max: 98.5 (10 Jerrod 2019 18:13)  HR: 85 (2019 06:30) (82 - 113)  BP: 110/69 (2019 06:30) (93/56 - 121/65)  BP(mean): --  RR: 19 (2019 06:30) (16 - 19)  SpO2: 100% (2019 06:30) (96% - 100%)    MEDICATIONS  (STANDING):  acetaminophen   Tablet .. 650 milliGRAM(s) Oral every 4 hours  diphtheria/tetanus/pertussis (acellular) Vaccine (ADAcel) 0.5 milliLiter(s) IntraMuscular once  ibuprofen  Tablet. 600 milliGRAM(s) Oral every 6 hours  influenza   Vaccine 0.5 milliLiter(s) IntraMuscular once  oxyCODONE    IR 5 milliGRAM(s) Oral every 3 hours  prenatal multivitamin 1 Tablet(s) Oral daily  sodium chloride 0.9% lock flush 3 milliLiter(s) IV Push every 8 hours      Labs:  Blood type: O Negative  Rubella IgG: Positive (01-10 @ 11:45)  RPR: Negative                          12.5   15.63<H> >-----------< 182    ( 01-10 @ 16:25 )             37.3                        13.5   11.05<H> >-----------< 202    ( 01-10 @ 08:00 )             41.7                  Physical Exam:  General: NAD  Abdomen: soft, non-tender, non-distended, fundus firm  Vaginal: lochia wnl, exam deferred  Extremities: no erythema/calf tenderness

## 2019-01-11 NOTE — LACTATION INITIAL EVALUATION - LACTATION INTERVENTIONS
initiate skin to skin/initiate hand expression routine/initiate dual electric pump routine/Assisted with positioning to facilitate deep latch.  Encouraged to feed on cue at least 8-12 times in 24 hours and follow the feeding log, reviewed.  Taught hand expression and given instruction for dual electric pump to maintain and increase breast milk production. Questions answered , support given .  Encouraged to call for assistance, as needed. Warm line and breastfeeding support group reviewed.

## 2019-01-11 NOTE — PROVIDER CONTACT NOTE (OTHER) - BACKGROUND
38 year old patient.  from 1/10/19 at 1221. Patient's blood type is O-. 's blood type is A indeterminant Angelo +.

## 2019-01-11 NOTE — LACTATION INITIAL EVALUATION - OTHER DISEASES WHICH COULD AFFECT LACTATION
Patient stated depression with previous pregnancy, no medication taken.  Patient stated hypothyroidism resolved during her pregnancy.  Patient stated PCOS also resolved.

## 2019-01-11 NOTE — PROVIDER CONTACT NOTE (OTHER) - ASSESSMENT
Patient was ordered rhogam. Patient verbally consented to rhogam after verbalizing understanding of reasoning and risks. When rhogam was going to be administered, patient's  said to not wake the patient, and they will discuss if they still want to receive rhogam. It was explained that it must be given in a timely manner.  then said "we do not want the rhogam and we do not want future children. " Talia Vickers was notified and spoke to patient and . They verbalized understanding of risks to possible future pregnancies. Rhogam was sent back to blood bank with note.

## 2019-01-11 NOTE — LACTATION INITIAL EVALUATION - PRO BREASTFEED PREV EXP YN OB
With previous pregnancy, infant had increased weight loss and , as per patient diagnosed with upper  lip tie./yes

## 2019-01-11 NOTE — PROGRESS NOTE ADULT - SUBJECTIVE AND OBJECTIVE BOX
ANESTHESIA POSTOP CHECK    38y Female DAY 1 S/P     Vital Signs Last 24 Hrs  T(C): 36.7 (2019 10:43), Max: 36.9 (10 Jerrod 2019 18:13)  T(F): 98.1 (2019 10:43), Max: 98.5 (10 Jerrod 2019 18:13)  HR: 92 (2019 10:43) (82 - 113)  BP: 117/76 (2019 10:43) (93/56 - 121/65)  BP(mean): --  RR: 18 (2019 10:43) (16 - 19)  SpO2: 99% (2019 10:43) (96% - 100%)  I&O's Summary    10 Jerrod 2019 07:01  -  2019 07:00  --------------------------------------------------------  IN: 0 mL / OUT: 3650 mL / NET: -3650 mL        [X] NO APPARENT ANESTHESIA COMPLICATIONS      Comments: Patient ambulating well

## 2019-01-12 ENCOUNTER — TRANSCRIPTION ENCOUNTER (OUTPATIENT)
Age: 39
End: 2019-01-12

## 2019-01-12 VITALS
DIASTOLIC BLOOD PRESSURE: 70 MMHG | OXYGEN SATURATION: 100 % | HEART RATE: 90 BPM | RESPIRATION RATE: 17 BRPM | SYSTOLIC BLOOD PRESSURE: 108 MMHG | TEMPERATURE: 98 F

## 2019-01-12 RX ORDER — IBUPROFEN 200 MG
1 TABLET ORAL
Qty: 0 | Refills: 0 | COMMUNITY
Start: 2019-01-12

## 2019-01-12 RX ADMIN — Medication 650 MILLIGRAM(S): at 05:57

## 2019-01-12 RX ADMIN — Medication 650 MILLIGRAM(S): at 06:30

## 2019-01-12 RX ADMIN — Medication 600 MILLIGRAM(S): at 19:32

## 2019-01-12 RX ADMIN — Medication 650 MILLIGRAM(S): at 00:30

## 2019-01-12 RX ADMIN — Medication 650 MILLIGRAM(S): at 01:00

## 2019-01-12 NOTE — DISCHARGE NOTE OB - MEDICATION SUMMARY - MEDICATIONS TO STOP TAKING
I will STOP taking the medications listed below when I get home from the hospital:    multivitamin, prenatal  --

## 2019-01-12 NOTE — DISCHARGE NOTE OB - CARE PROVIDER_API CALL
Walker Metz), Obstetrics and Gynecology  [address] [place] [address]  Phone: [phone]  Fax: [phone]

## 2019-01-12 NOTE — PROGRESS NOTE ADULT - SUBJECTIVE AND OBJECTIVE BOX
Assessment and Plan    Day  1  Vaginal Delivery  She feels well  Continue the current pain medication  Encourage  Ambulation  Encourage regular diet   DVT ppx: SCDs only when not ambulating  She is stable, tolerates a diet and has normal flatus and bowel movements  She will be discharged on Day 2 according to the normal criteria. Assessment and Plan    Day  2  Vaginal Delivery  She feels well  Continue the current pain medication  Encourage  Ambulation  Encourage regular diet   DVT ppx: SCDs only when not ambulating  She is stable, tolerates a diet and has normal flatus and bowel movements  She will be discharged on Day 2 according to the normal criteria.

## 2019-01-12 NOTE — DISCHARGE NOTE OB - CARE PLAN
Principal Discharge DX:	Vaginal delivery  Goal:	Routine postpartum care  Assessment and plan of treatment:	follow up in 4 weeks/ regular diet & activity as tolerate

## 2019-01-12 NOTE — DISCHARGE NOTE OB - PATIENT PORTAL LINK FT
You can access the [facility], offered by [facility], by registering with the following website: [email or web address]

## 2019-01-12 NOTE — DISCHARGE NOTE OB - MATERIALS PROVIDED
Guide to Postpartum Care/Vaccinations/[facility] Hearing Screen Program/Birth Certificate Instructions/[place]  Immunization Record/Breastfeeding Log/[facility] [place] Screening Program/Shaken Baby Prevention Handout

## 2019-08-01 ENCOUNTER — OUTPATIENT (OUTPATIENT)
Dept: OUTPATIENT SERVICES | Facility: HOSPITAL | Age: 39
LOS: 1 days | End: 2019-08-01

## 2019-08-08 ENCOUNTER — EMERGENCY (EMERGENCY)
Facility: HOSPITAL | Age: 39
LOS: 1 days | Discharge: HOME | End: 2019-08-08
Attending: EMERGENCY MEDICINE | Admitting: EMERGENCY MEDICINE
Payer: MEDICAID

## 2019-08-08 VITALS
TEMPERATURE: 98 F | HEART RATE: 80 BPM | OXYGEN SATURATION: 98 % | DIASTOLIC BLOOD PRESSURE: 68 MMHG | SYSTOLIC BLOOD PRESSURE: 114 MMHG | RESPIRATION RATE: 16 BRPM

## 2019-08-08 VITALS
DIASTOLIC BLOOD PRESSURE: 71 MMHG | OXYGEN SATURATION: 98 % | RESPIRATION RATE: 16 BRPM | SYSTOLIC BLOOD PRESSURE: 117 MMHG | HEART RATE: 82 BPM | TEMPERATURE: 99 F

## 2019-08-08 LAB
ALBUMIN SERPL ELPH-MCNC: 4.5 G/DL — SIGNIFICANT CHANGE UP (ref 3.3–5)
ALP SERPL-CCNC: 57 U/L — SIGNIFICANT CHANGE UP (ref 40–120)
ALT FLD-CCNC: 27 U/L — SIGNIFICANT CHANGE UP (ref 4–33)
ANION GAP SERPL CALC-SCNC: 16 MMO/L — HIGH (ref 7–14)
AST SERPL-CCNC: 30 U/L — SIGNIFICANT CHANGE UP (ref 4–32)
BASOPHILS # BLD AUTO: 0.05 K/UL — SIGNIFICANT CHANGE UP (ref 0–0.2)
BASOPHILS NFR BLD AUTO: 0.8 % — SIGNIFICANT CHANGE UP (ref 0–2)
BILIRUB SERPL-MCNC: 0.5 MG/DL — SIGNIFICANT CHANGE UP (ref 0.2–1.2)
BUN SERPL-MCNC: 13 MG/DL — SIGNIFICANT CHANGE UP (ref 7–23)
CALCIUM SERPL-MCNC: 9.6 MG/DL — SIGNIFICANT CHANGE UP (ref 8.4–10.5)
CHLORIDE SERPL-SCNC: 99 MMOL/L — SIGNIFICANT CHANGE UP (ref 98–107)
CO2 SERPL-SCNC: 23 MMOL/L — SIGNIFICANT CHANGE UP (ref 22–31)
CREAT SERPL-MCNC: 0.61 MG/DL — SIGNIFICANT CHANGE UP (ref 0.5–1.3)
EOSINOPHIL # BLD AUTO: 0.08 K/UL — SIGNIFICANT CHANGE UP (ref 0–0.5)
EOSINOPHIL NFR BLD AUTO: 1.3 % — SIGNIFICANT CHANGE UP (ref 0–6)
GLUCOSE SERPL-MCNC: 73 MG/DL — SIGNIFICANT CHANGE UP (ref 70–99)
HCT VFR BLD CALC: 39.9 % — SIGNIFICANT CHANGE UP (ref 34.5–45)
HGB BLD-MCNC: 13.9 G/DL — SIGNIFICANT CHANGE UP (ref 11.5–15.5)
IMM GRANULOCYTES NFR BLD AUTO: 0.3 % — SIGNIFICANT CHANGE UP (ref 0–1.5)
LYMPHOCYTES # BLD AUTO: 2.24 K/UL — SIGNIFICANT CHANGE UP (ref 1–3.3)
LYMPHOCYTES # BLD AUTO: 35 % — SIGNIFICANT CHANGE UP (ref 13–44)
MCHC RBC-ENTMCNC: 28.8 PG — SIGNIFICANT CHANGE UP (ref 27–34)
MCHC RBC-ENTMCNC: 34.8 % — SIGNIFICANT CHANGE UP (ref 32–36)
MCV RBC AUTO: 82.8 FL — SIGNIFICANT CHANGE UP (ref 80–100)
MONOCYTES # BLD AUTO: 0.44 K/UL — SIGNIFICANT CHANGE UP (ref 0–0.9)
MONOCYTES NFR BLD AUTO: 6.9 % — SIGNIFICANT CHANGE UP (ref 2–14)
NEUTROPHILS # BLD AUTO: 3.57 K/UL — SIGNIFICANT CHANGE UP (ref 1.8–7.4)
NEUTROPHILS NFR BLD AUTO: 55.7 % — SIGNIFICANT CHANGE UP (ref 43–77)
NRBC # FLD: 0 K/UL — SIGNIFICANT CHANGE UP (ref 0–0)
PLATELET # BLD AUTO: 238 K/UL — SIGNIFICANT CHANGE UP (ref 150–400)
PMV BLD: 10.4 FL — SIGNIFICANT CHANGE UP (ref 7–13)
POTASSIUM SERPL-MCNC: 3.6 MMOL/L — SIGNIFICANT CHANGE UP (ref 3.5–5.3)
POTASSIUM SERPL-SCNC: 3.6 MMOL/L — SIGNIFICANT CHANGE UP (ref 3.5–5.3)
PROT SERPL-MCNC: 7.5 G/DL — SIGNIFICANT CHANGE UP (ref 6–8.3)
RBC # BLD: 4.82 M/UL — SIGNIFICANT CHANGE UP (ref 3.8–5.2)
RBC # FLD: 13.2 % — SIGNIFICANT CHANGE UP (ref 10.3–14.5)
SODIUM SERPL-SCNC: 138 MMOL/L — SIGNIFICANT CHANGE UP (ref 135–145)
TSH SERPL-MCNC: 2.91 UIU/ML — SIGNIFICANT CHANGE UP (ref 0.27–4.2)
WBC # BLD: 6.4 K/UL — SIGNIFICANT CHANGE UP (ref 3.8–10.5)
WBC # FLD AUTO: 6.4 K/UL — SIGNIFICANT CHANGE UP (ref 3.8–10.5)

## 2019-08-08 PROCEDURE — 99283 EMERGENCY DEPT VISIT LOW MDM: CPT

## 2019-08-08 NOTE — ED PROVIDER NOTE - OBJECTIVE STATEMENT
Patient is a 38 year old female with PMH of Hypothyroidism (treated with herbal remedies) presenting to the ED complaining of palpitations that began when she woke up at 8am. She states she was just laying in bed when it occurred. States that the episode lasted about 10 minutes and got better when she drank water and ate salt. She states nothing like this has ever happened to her before. States she had some associated SOB with the palpitations. Patient has never seen a cardiologist. Patient had no associated chest pain, lightheadedness, nausea, diaphoresis associated with the episode. Also denies fevers, chills, URI symptoms, abdominal pain, vomiting, diarrhea, constipation, dysuria, increased urinary frequency.

## 2019-08-08 NOTE — ED ADULT TRIAGE NOTE - CHIEF COMPLAINT QUOTE
Pt bibems from home, here for feeling of heart racing and panic. no cp, sob, or fevers. Has had a recent weight loss (10lbs) due to keto diet.  feels like her electrolyte are off. symptoms have currently resolved. no pmhx Pt bibems from home, here for feeling of heart racing and panic. no cp, sob, or fevers. Has had a recent weight loss (10lbs) due to keto diet.  feels like her "electrolytes are off". symptoms have currently resolved. no pmhx

## 2019-08-08 NOTE — ED PROVIDER NOTE - PHYSICAL EXAMINATION
GENERAL: no acute distress, non-toxic appearing, resting comfortably in bed  HEAD: normocephalic, atraumatic  HEENT: normal conjunctiva, oral mucosa moist, neck supple without cervical lymphadenopathy  CARDIAC: regular rate and rhythm, normal S1 and S2,  no appreciable murmurs  PULM: clear to ascultation bilaterally, no crackles, rales, rhonchi, or wheezing  GI: abdomen nondistended, soft, nontender, no guarding or rebound tenderness  NEURO: alert and oriented x 3, normal speech, PERRLA, EOMI, CN 2-12 grossly intact, 5/5 strength in all four extremities, no focal motor or sensory deficits, nonantalgic gait  MSK: no visible deformities, no peripheral edema, calf tenderness/redness/swelling  SKIN: no visible rashes, dry, well-perfused  PSYCH: appropriate mood and affect

## 2019-08-08 NOTE — ED PROVIDER NOTE - ATTENDING CONTRIBUTION TO CARE
corwin: transient palpitations in pt with hy hypothyroidosm. No apparent risk factors for PE.   Exam unremarkable. Labs to evaluate for anemia and hyperthyroidism sent.

## 2019-08-08 NOTE — ED PROVIDER NOTE - NSFOLLOWUPINSTRUCTIONS_ED_ALL_ED_FT
Your diagnosis: Palpitations    Discharge instructions:    - Please follow up with your Primary Care Doctor.      - Be sure to return to the ED if you develop new or worsening symptoms. Specific signs and symptoms to be vigilant of: fever or chills, chest pain, difficulty breathing, palpitations, loss of consciousness, headache, vision changes, slurred speech, difficulty swallowing or drooling, facial droop, weakness in the arms or legs, numbness or tingling, abdominal pain, nausea or vomiting, diarrhea, constipation, blood in the stool or urine, pain on urination, difficulty urinating.

## 2019-08-08 NOTE — ED PROVIDER NOTE - CLINICAL SUMMARY MEDICAL DECISION MAKING FREE TEXT BOX
37 yo female pmh hypothyroidism present ts with transient palpitations and nl physical exam, labs incuding tsh sent to evaluate for thyroid disease.

## 2019-08-08 NOTE — ED PROVIDER NOTE - NS ED ROS FT
GENERAL: no fever, chills  HEENT: no cough, congestion, odynophagia, dysphagia  CARDIAC: +palpitations, no chest pain, lightheadedness  PULM: + SOB associated with palpitations   GI: no abdominal pain, nausea, vomiting, diarrhea, constipation, melena, hematochezia  : no urinary dysuria, frequency, incontinence, hematuria  NEURO: no headache, blurry vision, motor weakness, sensory changes  MSK: no joint or muscle pain  SKIN: no rashes, hives  HEME: no active bleeding, bruising

## 2019-08-09 DIAGNOSIS — Z71.89 OTHER SPECIFIED COUNSELING: ICD-10-CM

## 2020-05-16 NOTE — ED ADULT NURSE NOTE - CAS EDN INTEG ASSESS
[facility]  [facility]  Phone: [phone]      Pt Name: Marlo Wilkins  MRN: [id number]  [place] 1990  Date of evaluation: 5/15/2020      CHIEF COMPLAINT       Chief Complaint   Patient presents with    Otalgia     right ear pain          HISTORY OF PRESENT ILLNESS    Marlo Wilkins is a 26 y.o. female who presents   Chief Complaint   Patient presents with    Otalgia     right ear pain    . 58-year-old female patient presents to the emergency department complaining of runny nose, congestion, postnasal drip, right earache, right maxillary facial pain and right jaw pain since yesterday morning 1 day prior to arrival.  She denies any cough, sore throat, fever or chills. She denies any discharge or drainage from the ear canal.  She uses Q-tips to clean the ear canal.  She has taken ibuprofen 800 mg and Tylenol 650 mg without any relief. Patient has history of recurrent sinus infections causing ear pain. There are no exacerbating or relieving factors. Patient grades pain is 8 out of 10 in intensity. REVIEW OF SYSTEMS       Review of Systems   Constitutional: Negative for chills and fever. HENT: Positive for ear pain, postnasal drip, rhinorrhea and sinus pain. Negative for dental problem and ear discharge. Respiratory: Negative for cough. Cardiovascular: Negative for chest pain and palpitations. Gastrointestinal: Negative for abdominal pain and nausea. Neurological: Negative for light-headedness and headaches. All other systems reviewed and are negative. PAST MEDICAL HISTORY    has a past medical history of Anxiety, Cervical dysplasia, Depression, Mild episode of recurrent major depressive disorder ([address] and PCOS (polycystic ovarian syndrome).     SURGICAL HISTORY      has a past surgical history that includes Breast surgery; knee surgery; hysteroscopy; Dilation and curettage of uterus; Lumbar disc surgery (2010); and [place] Palpations: Abdomen is soft. Tenderness: There is no abdominal tenderness. Skin:     General: Skin is warm and dry. Neurological:      General: No focal deficit present. Mental Status: She is alert and oriented to person, place, and time. DIFFERENTIAL DIAGNOSIS/ MDM:     Maxillary sinusitis, otalgia, viral URI    DIAGNOSTIC RESULTS     EKG: All EKG's are interpreted by the Emergency Department Physician who either signs or Co-signs this chart in the absence of a cardiologist.    None obtained    RADIOLOGY:   I directly visualized the following  images and reviewed the radiologist interpretations:  No orders to display       No results found. LABS:  Labs Reviewed - No data to display      EMERGENCY DEPARTMENT COURSE:   Vitals:    Vitals:    05/15/20 2226   BP: 133/87   Pulse: 61   Resp: 14   Temp: 98.4 °F (36.9 °C)   TempSrc: Oral   SpO2: 97%   Weight: 79.4 kg (175 lb)   Height: 5' 3\" (1.6 m)     -------------------------  BP: 133/87, Temp: 98.4 °F (36.9 °C), Pulse: 61, Resp: 14    Orders Placed This Encounter   Medications    amoxicillin (AMOXIL) capsule 500 mg    DISCONTD: acetaminophen (TYLENOL) tablet 1,000 mg    amoxicillin (AMOXIL) 500 MG capsule     Sig: Take 1 capsule by mouth 3 times daily for 10 days     Dispense:  30 capsule     Refill:  0       During the emergency department course, patient was given amoxicillin 500 mg orally and then discharged home on amoxicillin for 10 days course. She is advised to continue Tylenol and ibuprofen as needed for the pain, plenty of oral fluids, follow-up with PCP, return if worse. I have reviewed the disposition diagnosis with the patient and or their family/guardian. I have answered their questions and given discharge instructions. They voiced understanding of these instructions and did not have any further questions or complaints.     Re-evaluation Notes    Patient is resting comfortably and does not appear to be in any pain WDL

## 2022-11-29 NOTE — ED ADULT NURSE NOTE - PSH
Rx Refill Note  Requested Prescriptions     Pending Prescriptions Disp Refills   • Concerta 36 MG CR tablet 30 tablet 0     Sig: Take 1 tablet by mouth Daily      Last office visit with prescribing clinician: 10/13/2022   Last telemedicine visit with prescribing clinician: 1/12/2023   Next office visit with prescribing clinician: 1/12/2023                         Would you like a call back once the refill request has been completed: [] Yes [] No    If the office needs to give you a call back, can they leave a voicemail: [] Yes [] No    Aleshia Eid  11/29/22, 07:33 EST   No significant past surgical history

## 2023-04-05 PROBLEM — Z67.91 BLOOD TYPE, RH NEGATIVE: Status: ACTIVE | Noted: 2018-11-14

## 2023-05-12 NOTE — ED ADULT NURSE NOTE - NSSISCREENINGQ2_ED_A_ED
Next appt 06/26/2023  Last appt 03/29/2023    Refill request for   Disp Refills Start End    magnesium oxide (MAG-OX) 400 (240 Mg) MG tablet 178 tablet 3 6/14/2022     Sig - Route: Take 1 tablet by mouth 2 times daily. - Oral      NON PROTOCOL MEDICATION    Routed to provider's pool to review, thanks.        No

## 2023-06-13 NOTE — ED PROVIDER NOTE - DATE/TIME 1
Rx Refill Note  Requested Prescriptions     Pending Prescriptions Disp Refills    fluticasone (FLONASE) 50 MCG/ACT nasal spray [Pharmacy Med Name: FLUTICASONE PROP 50 MCG SPRAY] 48 mL 1     Sig: SPRAY 2 SPRAYS INTO THE NOSTRIL AS DIRECTED BY PROVIDER DAILY.      Last office visit with prescribing clinician: 5/26/2022   Next office visit with prescribing clinician: Visit date not found                         Would you like a call back once the refill request has been completed: [] Yes [] No    If the office needs to give you a call back, can they leave a voicemail: [] Yes [] No    Vilma Dupree LPN  06/13/23, 08:59 EDT   15-Aug-2018 07:07

## 2024-01-10 NOTE — PROGRESS NOTE ADULT - ASSESSMENT
Patient was advised and in agreement they do not meet Urgent Care criteria based on triage symptoms.   
A/P: 39yo PPD#1 s/p  c/b EBL: 500+300 for oozing laceration s/p re-repair..  Patient is stable and doing well post-partum.
Plan: Pt PPD#0 s/p  total EBL 800cc. Tachycardic to 120s. BPs stable.